# Patient Record
Sex: FEMALE | Race: WHITE | NOT HISPANIC OR LATINO | URBAN - METROPOLITAN AREA
[De-identification: names, ages, dates, MRNs, and addresses within clinical notes are randomized per-mention and may not be internally consistent; named-entity substitution may affect disease eponyms.]

---

## 2017-01-05 ENCOUNTER — EMERGENCY (EMERGENCY)
Facility: HOSPITAL | Age: 62
LOS: 1 days | Discharge: HOME | End: 2017-01-05
Attending: EMERGENCY MEDICINE | Admitting: EMERGENCY MEDICINE
Payer: COMMERCIAL

## 2017-01-05 VITALS
DIASTOLIC BLOOD PRESSURE: 84 MMHG | TEMPERATURE: 98 F | OXYGEN SATURATION: 100 % | HEART RATE: 75 BPM | SYSTOLIC BLOOD PRESSURE: 180 MMHG | RESPIRATION RATE: 18 BRPM

## 2017-01-05 DIAGNOSIS — R06.02 SHORTNESS OF BREATH: ICD-10-CM

## 2017-01-05 DIAGNOSIS — R55 SYNCOPE AND COLLAPSE: ICD-10-CM

## 2017-01-05 DIAGNOSIS — Z90.49 ACQUIRED ABSENCE OF OTHER SPECIFIED PARTS OF DIGESTIVE TRACT: ICD-10-CM

## 2017-01-05 DIAGNOSIS — R07.89 OTHER CHEST PAIN: ICD-10-CM

## 2017-01-05 DIAGNOSIS — R51 HEADACHE: ICD-10-CM

## 2017-01-05 LAB
ALBUMIN SERPL ELPH-MCNC: 4.2 G/DL — SIGNIFICANT CHANGE UP (ref 3.3–5)
ALP SERPL-CCNC: 96 U/L — SIGNIFICANT CHANGE UP (ref 40–120)
ALT FLD-CCNC: 25 U/L RC — SIGNIFICANT CHANGE UP (ref 10–45)
ANION GAP SERPL CALC-SCNC: 13 MMOL/L — SIGNIFICANT CHANGE UP (ref 5–17)
AST SERPL-CCNC: 22 U/L — SIGNIFICANT CHANGE UP (ref 10–40)
BASOPHILS # BLD AUTO: 0 K/UL — SIGNIFICANT CHANGE UP (ref 0–0.2)
BASOPHILS NFR BLD AUTO: 0.1 % — SIGNIFICANT CHANGE UP (ref 0–2)
BILIRUB SERPL-MCNC: <0.1 MG/DL — LOW (ref 0.2–1.2)
BUN SERPL-MCNC: 14 MG/DL — SIGNIFICANT CHANGE UP (ref 7–23)
CALCIUM SERPL-MCNC: 9.2 MG/DL — SIGNIFICANT CHANGE UP (ref 8.4–10.5)
CHLORIDE SERPL-SCNC: 104 MMOL/L — SIGNIFICANT CHANGE UP (ref 96–108)
CO2 SERPL-SCNC: 26 MMOL/L — SIGNIFICANT CHANGE UP (ref 22–31)
CREAT SERPL-MCNC: 0.8 MG/DL — SIGNIFICANT CHANGE UP (ref 0.5–1.3)
EOSINOPHIL # BLD AUTO: 0.2 K/UL — SIGNIFICANT CHANGE UP (ref 0–0.5)
EOSINOPHIL NFR BLD AUTO: 3 % — SIGNIFICANT CHANGE UP (ref 0–6)
GLUCOSE SERPL-MCNC: 96 MG/DL — SIGNIFICANT CHANGE UP (ref 70–99)
HCT VFR BLD CALC: 36.9 % — SIGNIFICANT CHANGE UP (ref 34.5–45)
HGB BLD-MCNC: 12.6 G/DL — SIGNIFICANT CHANGE UP (ref 11.5–15.5)
LYMPHOCYTES # BLD AUTO: 1.6 K/UL — SIGNIFICANT CHANGE UP (ref 1–3.3)
LYMPHOCYTES # BLD AUTO: 31.6 % — SIGNIFICANT CHANGE UP (ref 13–44)
MCHC RBC-ENTMCNC: 29.8 PG — SIGNIFICANT CHANGE UP (ref 27–34)
MCHC RBC-ENTMCNC: 34.1 GM/DL — SIGNIFICANT CHANGE UP (ref 32–36)
MCV RBC AUTO: 87.2 FL — SIGNIFICANT CHANGE UP (ref 80–100)
MONOCYTES # BLD AUTO: 0.4 K/UL — SIGNIFICANT CHANGE UP (ref 0–0.9)
MONOCYTES NFR BLD AUTO: 7.5 % — SIGNIFICANT CHANGE UP (ref 2–14)
NEUTROPHILS # BLD AUTO: 3 K/UL — SIGNIFICANT CHANGE UP (ref 1.8–7.4)
NEUTROPHILS NFR BLD AUTO: 57.8 % — SIGNIFICANT CHANGE UP (ref 43–77)
PLATELET # BLD AUTO: 247 K/UL — SIGNIFICANT CHANGE UP (ref 150–400)
POTASSIUM SERPL-MCNC: 4 MMOL/L — SIGNIFICANT CHANGE UP (ref 3.5–5.3)
POTASSIUM SERPL-SCNC: 4 MMOL/L — SIGNIFICANT CHANGE UP (ref 3.5–5.3)
PROT SERPL-MCNC: 7.1 G/DL — SIGNIFICANT CHANGE UP (ref 6–8.3)
RBC # BLD: 4.23 M/UL — SIGNIFICANT CHANGE UP (ref 3.8–5.2)
RBC # FLD: 12.3 % — SIGNIFICANT CHANGE UP (ref 10.3–14.5)
SODIUM SERPL-SCNC: 143 MMOL/L — SIGNIFICANT CHANGE UP (ref 135–145)
TROPONIN T SERPL-MCNC: <0.01 NG/ML — SIGNIFICANT CHANGE UP (ref 0–0.06)
WBC # BLD: 5.2 K/UL — SIGNIFICANT CHANGE UP (ref 3.8–10.5)
WBC # FLD AUTO: 5.2 K/UL — SIGNIFICANT CHANGE UP (ref 3.8–10.5)

## 2017-01-05 PROCEDURE — 71020: CPT | Mod: 26

## 2017-01-05 PROCEDURE — 70450 CT HEAD/BRAIN W/O DYE: CPT | Mod: 26

## 2017-01-05 PROCEDURE — 93010 ELECTROCARDIOGRAM REPORT: CPT | Mod: 77

## 2017-01-05 PROCEDURE — 93010 ELECTROCARDIOGRAM REPORT: CPT

## 2017-01-05 PROCEDURE — 99236 HOSP IP/OBS SAME DATE HI 85: CPT | Mod: 25

## 2017-01-05 RX ORDER — ASPIRIN/CALCIUM CARB/MAGNESIUM 324 MG
325 TABLET ORAL ONCE
Qty: 0 | Refills: 0 | Status: COMPLETED | OUTPATIENT
Start: 2017-01-05 | End: 2017-01-05

## 2017-01-05 RX ORDER — SODIUM CHLORIDE 9 MG/ML
3 INJECTION INTRAMUSCULAR; INTRAVENOUS; SUBCUTANEOUS EVERY 12 HOURS
Qty: 0 | Refills: 0 | Status: DISCONTINUED | OUTPATIENT
Start: 2017-01-05 | End: 2017-01-09

## 2017-01-05 RX ORDER — SODIUM CHLORIDE 9 MG/ML
1000 INJECTION INTRAMUSCULAR; INTRAVENOUS; SUBCUTANEOUS ONCE
Qty: 0 | Refills: 0 | Status: COMPLETED | OUTPATIENT
Start: 2017-01-05 | End: 2017-01-05

## 2017-01-05 RX ORDER — SODIUM CHLORIDE 9 MG/ML
3 INJECTION INTRAMUSCULAR; INTRAVENOUS; SUBCUTANEOUS ONCE
Qty: 0 | Refills: 0 | Status: COMPLETED | OUTPATIENT
Start: 2017-01-05 | End: 2017-01-05

## 2017-01-05 RX ADMIN — SODIUM CHLORIDE 1000 MILLILITER(S): 9 INJECTION INTRAMUSCULAR; INTRAVENOUS; SUBCUTANEOUS at 20:07

## 2017-01-05 RX ADMIN — SODIUM CHLORIDE 3 MILLILITER(S): 9 INJECTION INTRAMUSCULAR; INTRAVENOUS; SUBCUTANEOUS at 23:29

## 2017-01-05 RX ADMIN — Medication 325 MILLIGRAM(S): at 20:08

## 2017-01-05 RX ADMIN — SODIUM CHLORIDE 3 MILLILITER(S): 9 INJECTION INTRAMUSCULAR; INTRAVENOUS; SUBCUTANEOUS at 20:07

## 2017-01-05 NOTE — ED CDU PROVIDER NOTE - ENMT, MLM
Airway patent. Nasal mucosa clear. Mouth with normal mucosa. Throat has no vesicles, no oropharyngeal exudates and uvula is midline.

## 2017-01-05 NOTE — ED ADULT NURSE NOTE - OBJECTIVE STATEMENT
60 y/o female pt presents to ED c/o SOB and CP x 20 mins, states pain is epigastric and began while walking, no fevers/chills, no vomiting, states had nausea with pain. No cardiac hx, nonsmoker, no drinking or drug use. Pt placed on cardiac monitor in NSR. Abd nontender and nondistended, skin warm dry and intact. Lungs clear, pt in no resp distress. Pt in no acute distress.

## 2017-01-05 NOTE — ED ADULT NURSE REASSESSMENT NOTE - NS ED NURSE REASSESS COMMENT FT1
pt awaiting CT results
pt to CT
pt to xray
Pt received from QING Rizzo. Pt oriented to CDU & plan of care was discussed. No complaints of chest pain, SOB, dizziness or palpitations. Safety & comfort measures maintained. Call bell in reach. Will continue to monitor.

## 2017-01-05 NOTE — ED CDU PROVIDER NOTE - ATTENDING CONTRIBUTION TO CARE
I have personally performed a face to face diagnostic evaluation on this patient.  I have reviewed the ACP note and agree with the history, exam, and plan of care, except as noted.  History and Exam by me shows  See ED provider note  Wilmar Barber MD, Facep

## 2017-01-05 NOTE — ED PROVIDER NOTE - NS ED ATTENDING STATEMENT MOD
I have personally seen and examined this patient. I have fully participated in the care of this patient. I have reviewed all pertinent clinical information, including history physical exam, plan and the Resident's note and agree except as noted I have personally performed a face to face diagnostic evaluation on this patient. I have reviewed the PA note and agree with the history, exam, and plan of care, except as noted.

## 2017-01-05 NOTE — ED CDU PROVIDER NOTE - OBJECTIVE STATEMENT
61 F with no significant PMHx presenting with SOB and CP. Onset noon today, lasted 20 min. Intermittent, exertional, associated with N, no V. Never happened before. Substernal, radiating to L arm. +lightheadedness, mild occipital headache. Never smoker.   No medications or allergies. 61 F with no significant PMHx came to ED with c/o cp, lightheadedness, weakness, sob episode that started today at 11:00am. Pt reports that she had just got to work and sat down and then symptoms came on suddenly. never experienced in the past. episode lasted 20 min. pt reports cp radiated to L arm. +lightheadedness, mild occipital headache. +N.  pt reports she did not feel like she was going to pass out but very weak that she couldn't stand.  Never smoker.   No medications or allergies.

## 2017-01-05 NOTE — ED PROVIDER NOTE - MEDICAL DECISION MAKING DETAILS
62 y/o F here for chest pain. R/O ACS given classic history. Will give ASA, EKG, CXR, cardiac enzymes, basic labs, likely admit for stress test vs cath.

## 2017-01-05 NOTE — ED PROVIDER NOTE - ATTENDING CONTRIBUTION TO CARE
Private Physician Gaby Gipson ([facility])  61y female pmh no meds, no habits, Hypertension,hld,cad,dm,travel,cancer, Pt comes to ed complains of 3d hx of SOB, chest pain, lightheadedness, and sensation of "pulling in her head, like the back of head is pulling down/heavyness"  No pain.  Not worst ha of life not severe. Nausea without vomiting. No fever and chills no cough. hemoptysis. Seen at urgent care and referred to ed. Now feels "ok" at present,. No precipitating or alleviating factors. Lasts 20 minutes. Has had two recurrencs since. PE WDWN male nad normocephalic atraumatic chest clear anterior & posterior abd soft CV no rubs, gallops or murmurs, Abd soft neruo no focal defects  Wilmar Barber MD, Facep

## 2017-01-05 NOTE — ED PROVIDER NOTE - OBJECTIVE STATEMENT
61 F with no significant PMHx presenting with SOB and CP. Onset noon today, lasted 20 min. Intermittent, exertional, associated with N, no V. Never happened before. Substernal, radiating to L arm. +lightheadedness, mild occipital headache. Never smoker.   No medications or allergies.

## 2017-01-05 NOTE — ED CDU PROVIDER NOTE - PLAN OF CARE
1. Stay hydrated.  2. Follow up with your PCP Dr. De Souza in 1-2 days (Bring printed results to your doctor visit).  3. Return if symptoms, worsen, fever, weakness, chest pain, difficulty breathing, dizziness and all other concerns. 1. Stay hydrated. Take Aspirin 81mg once a day until further follow up.   2. Follow up with your PCP Dr. De Souza in 1-2 days or cardiologist (information provided) Bring printed results to your doctor visit.  3. Return if symptoms, worsen, fever, weakness, chest pain, difficulty breathing, dizziness and all other concerns.

## 2017-01-06 VITALS
OXYGEN SATURATION: 100 % | RESPIRATION RATE: 18 BRPM | TEMPERATURE: 98 F | SYSTOLIC BLOOD PRESSURE: 115 MMHG | HEART RATE: 61 BPM | DIASTOLIC BLOOD PRESSURE: 66 MMHG

## 2017-01-06 DIAGNOSIS — Z90.49 ACQUIRED ABSENCE OF OTHER SPECIFIED PARTS OF DIGESTIVE TRACT: Chronic | ICD-10-CM

## 2017-01-06 LAB
CHOLEST SERPL-MCNC: 178 MG/DL — SIGNIFICANT CHANGE UP (ref 10–199)
HBA1C BLD-MCNC: 5.7 % — HIGH (ref 4–5.6)
HDLC SERPL-MCNC: 43 MG/DL — SIGNIFICANT CHANGE UP (ref 40–125)
LIPID PNL WITH DIRECT LDL SERPL: 107 MG/DL — SIGNIFICANT CHANGE UP
TOTAL CHOLESTEROL/HDL RATIO MEASUREMENT: 4.1 RATIO — SIGNIFICANT CHANGE UP (ref 3.3–7.1)
TRIGL SERPL-MCNC: 139 MG/DL — SIGNIFICANT CHANGE UP (ref 10–149)
TROPONIN T SERPL-MCNC: <0.01 NG/ML — SIGNIFICANT CHANGE UP (ref 0–0.06)

## 2017-01-06 PROCEDURE — 80053 COMPREHEN METABOLIC PANEL: CPT

## 2017-01-06 PROCEDURE — 75574 CT ANGIO HRT W/3D IMAGE: CPT

## 2017-01-06 PROCEDURE — 85027 COMPLETE CBC AUTOMATED: CPT

## 2017-01-06 PROCEDURE — 82550 ASSAY OF CK (CPK): CPT

## 2017-01-06 PROCEDURE — 70450 CT HEAD/BRAIN W/O DYE: CPT

## 2017-01-06 PROCEDURE — 83036 HEMOGLOBIN GLYCOSYLATED A1C: CPT

## 2017-01-06 PROCEDURE — 99284 EMERGENCY DEPT VISIT MOD MDM: CPT | Mod: 25

## 2017-01-06 PROCEDURE — 84484 ASSAY OF TROPONIN QUANT: CPT

## 2017-01-06 PROCEDURE — 75574 CT ANGIO HRT W/3D IMAGE: CPT | Mod: 26

## 2017-01-06 PROCEDURE — 80061 LIPID PANEL: CPT

## 2017-01-06 PROCEDURE — 82553 CREATINE MB FRACTION: CPT

## 2017-01-06 PROCEDURE — 71046 X-RAY EXAM CHEST 2 VIEWS: CPT

## 2017-01-06 PROCEDURE — 93005 ELECTROCARDIOGRAM TRACING: CPT | Mod: 76

## 2017-01-06 PROCEDURE — G0378: CPT

## 2017-01-06 RX ADMIN — SODIUM CHLORIDE 3 MILLILITER(S): 9 INJECTION INTRAMUSCULAR; INTRAVENOUS; SUBCUTANEOUS at 08:12

## 2018-09-07 ENCOUNTER — EMERGENCY (EMERGENCY)
Facility: HOSPITAL | Age: 63
LOS: 1 days | Discharge: HOME | End: 2018-09-07
Attending: EMERGENCY MEDICINE
Payer: COMMERCIAL

## 2018-09-07 VITALS
OXYGEN SATURATION: 95 % | WEIGHT: 134.92 LBS | HEIGHT: 62 IN | RESPIRATION RATE: 18 BRPM | SYSTOLIC BLOOD PRESSURE: 166 MMHG | DIASTOLIC BLOOD PRESSURE: 83 MMHG | TEMPERATURE: 98 F | HEART RATE: 100 BPM

## 2018-09-07 VITALS
OXYGEN SATURATION: 97 % | TEMPERATURE: 98 F | SYSTOLIC BLOOD PRESSURE: 149 MMHG | DIASTOLIC BLOOD PRESSURE: 79 MMHG | HEART RATE: 65 BPM | RESPIRATION RATE: 18 BRPM

## 2018-09-07 DIAGNOSIS — Z90.49 ACQUIRED ABSENCE OF OTHER SPECIFIED PARTS OF DIGESTIVE TRACT: Chronic | ICD-10-CM

## 2018-09-07 PROCEDURE — 70450 CT HEAD/BRAIN W/O DYE: CPT

## 2018-09-07 PROCEDURE — 73080 X-RAY EXAM OF ELBOW: CPT | Mod: 26,LT

## 2018-09-07 PROCEDURE — 73502 X-RAY EXAM HIP UNI 2-3 VIEWS: CPT

## 2018-09-07 PROCEDURE — 73030 X-RAY EXAM OF SHOULDER: CPT | Mod: 26,LT

## 2018-09-07 PROCEDURE — 99284 EMERGENCY DEPT VISIT MOD MDM: CPT | Mod: 25

## 2018-09-07 PROCEDURE — 70450 CT HEAD/BRAIN W/O DYE: CPT | Mod: 26

## 2018-09-07 PROCEDURE — 99284 EMERGENCY DEPT VISIT MOD MDM: CPT

## 2018-09-07 PROCEDURE — 73030 X-RAY EXAM OF SHOULDER: CPT

## 2018-09-07 PROCEDURE — 73502 X-RAY EXAM HIP UNI 2-3 VIEWS: CPT | Mod: 26,LT

## 2018-09-07 PROCEDURE — 73080 X-RAY EXAM OF ELBOW: CPT

## 2018-09-07 RX ORDER — ACETAMINOPHEN 500 MG
975 TABLET ORAL ONCE
Qty: 0 | Refills: 0 | Status: COMPLETED | OUTPATIENT
Start: 2018-09-07 | End: 2018-09-07

## 2018-09-07 RX ORDER — ONDANSETRON 8 MG/1
4 TABLET, FILM COATED ORAL ONCE
Qty: 0 | Refills: 0 | Status: COMPLETED | OUTPATIENT
Start: 2018-09-07 | End: 2018-09-07

## 2018-09-07 RX ADMIN — ONDANSETRON 4 MILLIGRAM(S): 8 TABLET, FILM COATED ORAL at 18:41

## 2018-09-07 RX ADMIN — Medication 975 MILLIGRAM(S): at 18:40

## 2018-09-07 NOTE — ED PROVIDER NOTE - PHYSICAL EXAMINATION
head atraumatic, spine nontender, no focal bony ttp left shoulder/elbow/forearm. no focal ttp left hip

## 2018-09-07 NOTE — ED PROVIDER NOTE - CARE PLAN
Principal Discharge DX:	Closed head injury  Assessment and plan of treatment:	Your CAT scan and x-rays showed no internal injuries. You may use Tylenol 650mg every 8 hours or Motrin 600mg every 8 hours as needed for pain.     Please see your regular doctor in 3-5 days to ensure improvement.  Secondary Diagnosis:	Hip pain

## 2018-09-07 NOTE — ED ADULT TRIAGE NOTE - CHIEF COMPLAINT QUOTE
Pt slipped and fell yesterday on wet floor, hit L side of head on tile floor, no LOC.   Pt also hit L arm and L hip/knee/leg. Pt reports L side of body pain was mild, but worsened today.  Went to PCP Gaby Powell who sent pt in for r/o L hip fx and concussion.  Not on blood thinners. No confusion, visual changes, weakness.

## 2018-09-07 NOTE — ED ADULT NURSE NOTE - OBJECTIVE STATEMENT
61 y/o female presents to ed s/p fall yesterday. States she was in the bathroom when she fell on her left side injuring her left arm and left leg/hip. Denies LOC/ hitting her Head. denies chest pain, sob, ha, n/v/d, abdominal pain, f/c, urinary symptoms, hematuria. A&Ox4, vss, slight swelling noted to left hand and elbow, skin warm dry and intact, MAEx4, lungs CTA, abd soft nondistended. Pt resting comfortably with VSS, no complaints at this time. Patient's bed in the lowest position, explained plan of care to patient and family members. Will continue to reassess.

## 2018-09-07 NOTE — ED PROVIDER NOTE - PLAN OF CARE
Your CAT scan and x-rays showed no internal injuries. You may use Tylenol 650mg every 8 hours or Motrin 600mg every 8 hours as needed for pain.     Please see your regular doctor in 3-5 days to ensure improvement.

## 2018-09-07 NOTE — ED ADULT NURSE NOTE - NSIMPLEMENTINTERV_GEN_ALL_ED
Implemented All Fall Risk Interventions:  [place] to call system. Call bell, personal items and telephone within reach. Instruct patient to call for assistance. Room bathroom lighting operational. Non-slip footwear when patient is off stretcher. Physically safe environment: no spills, clutter or unnecessary equipment. Stretcher in lowest position, wheels locked, appropriate side rails in place. Provide visual cue, wrist band, yellow gown, etc. Monitor gait and stability. Monitor for mental status changes and reorient to person, place, and time. Review medications for side effects contributing to fall risk. Reinforce activity limits and safety measures with patient and family.

## 2018-09-07 NOTE — ED PROVIDER NOTE - OBJECTIVE STATEMENT
62F pw left sided headache, shoulder/elbow pain and left hip pain after fall from standing and slip onto left side 1 day prior. +head strike but no LOC. +nausea but no emesis. No lateralizing weakness, no visual changes. Patient ambulatory but with pain of left hip. Took motrin the day of presentation with some improvement in pain. Seen by PMD and referred to ED for XR to ro fx of hip

## 2018-09-07 NOTE — ED PROVIDER NOTE - MEDICAL DECISION MAKING DETAILS
Attending MD Silveira: 62F with left sided headache, nausea and LUE and L hip pain sp fall from standing 1 day prior. Nonfocal neuro exam, no focal bony ttp, Cervical spine cleared clinically of fracture without need for imaging according to [employer] Criteria. Plan for CT head to ro ICH, XR L shoulder/elbow and hip to ro fx

## 2019-10-25 ENCOUNTER — NON-APPOINTMENT (OUTPATIENT)
Age: 64
End: 2019-10-25

## 2019-10-25 ENCOUNTER — APPOINTMENT (OUTPATIENT)
Dept: OPHTHALMOLOGY | Facility: CLINIC | Age: 64
End: 2019-10-25
Payer: COMMERCIAL

## 2019-10-25 PROCEDURE — 92015 DETERMINE REFRACTIVE STATE: CPT

## 2019-10-25 PROCEDURE — 92004 COMPRE OPH EXAM NEW PT 1/>: CPT

## 2019-11-22 ENCOUNTER — APPOINTMENT (OUTPATIENT)
Dept: RHEUMATOLOGY | Facility: CLINIC | Age: 64
End: 2019-11-22
Payer: COMMERCIAL

## 2019-11-22 ENCOUNTER — LABORATORY RESULT (OUTPATIENT)
Age: 64
End: 2019-11-22

## 2019-11-22 VITALS
DIASTOLIC BLOOD PRESSURE: 74 MMHG | TEMPERATURE: 97.9 F | WEIGHT: 143 LBS | SYSTOLIC BLOOD PRESSURE: 125 MMHG | BODY MASS INDEX: 26.31 KG/M2 | HEIGHT: 62 IN | HEART RATE: 65 BPM | OXYGEN SATURATION: 98 %

## 2019-11-22 DIAGNOSIS — M70.61 TROCHANTERIC BURSITIS, RIGHT HIP: ICD-10-CM

## 2019-11-22 DIAGNOSIS — M54.2 CERVICALGIA: ICD-10-CM

## 2019-11-22 PROCEDURE — 99204 OFFICE O/P NEW MOD 45 MIN: CPT

## 2019-12-03 ENCOUNTER — APPOINTMENT (OUTPATIENT)
Dept: RADIOLOGY | Facility: IMAGING CENTER | Age: 64
End: 2019-12-03
Payer: COMMERCIAL

## 2019-12-03 ENCOUNTER — OUTPATIENT (OUTPATIENT)
Dept: OUTPATIENT SERVICES | Facility: HOSPITAL | Age: 64
LOS: 1 days | End: 2019-12-03
Payer: COMMERCIAL

## 2019-12-03 DIAGNOSIS — M54.16 RADICULOPATHY, LUMBAR REGION: ICD-10-CM

## 2019-12-03 DIAGNOSIS — Z90.49 ACQUIRED ABSENCE OF OTHER SPECIFIED PARTS OF DIGESTIVE TRACT: Chronic | ICD-10-CM

## 2019-12-03 DIAGNOSIS — M25.50 PAIN IN UNSPECIFIED JOINT: ICD-10-CM

## 2019-12-03 DIAGNOSIS — M54.2 CERVICALGIA: ICD-10-CM

## 2019-12-03 PROCEDURE — 72100 X-RAY EXAM L-S SPINE 2/3 VWS: CPT | Mod: 26

## 2019-12-03 PROCEDURE — 72040 X-RAY EXAM NECK SPINE 2-3 VW: CPT | Mod: 26

## 2019-12-03 PROCEDURE — 73120 X-RAY EXAM OF HAND: CPT | Mod: 26,50

## 2019-12-03 PROCEDURE — 77080 DXA BONE DENSITY AXIAL: CPT | Mod: 26

## 2019-12-03 PROCEDURE — 72040 X-RAY EXAM NECK SPINE 2-3 VW: CPT

## 2019-12-03 PROCEDURE — 73120 X-RAY EXAM OF HAND: CPT

## 2019-12-03 PROCEDURE — 72100 X-RAY EXAM L-S SPINE 2/3 VWS: CPT

## 2019-12-03 PROCEDURE — 77080 DXA BONE DENSITY AXIAL: CPT

## 2019-12-05 LAB
25(OH)D3 SERPL-MCNC: 33.8 NG/ML
ALBUMIN MFR SERPL ELPH: 62.5 %
ALBUMIN SERPL ELPH-MCNC: 4.7 G/DL
ALBUMIN SERPL-MCNC: 4.5 G/DL
ALBUMIN/GLOB SERPL: 1.7 RATIO
ALP BLD-CCNC: 90 U/L
ALPHA1 GLOB MFR SERPL ELPH: 4.1 %
ALPHA1 GLOB SERPL ELPH-MCNC: 0.3 G/DL
ALPHA2 GLOB MFR SERPL ELPH: 10.7 %
ALPHA2 GLOB SERPL ELPH-MCNC: 0.8 G/DL
ALT SERPL-CCNC: 20 U/L
ANION GAP SERPL CALC-SCNC: 13 MMOL/L
AST SERPL-CCNC: 21 U/L
B-GLOBULIN MFR SERPL ELPH: 11.6 %
B-GLOBULIN SERPL ELPH-MCNC: 0.8 G/DL
BASOPHILS # BLD AUTO: 0.04 K/UL
BASOPHILS NFR BLD AUTO: 0.8 %
BILIRUB SERPL-MCNC: 0.2 MG/DL
BUN SERPL-MCNC: 12 MG/DL
CALCIUM SERPL-MCNC: 10.1 MG/DL
CCP AB SER IA-ACNC: <8 UNITS
CHLORIDE SERPL-SCNC: 102 MMOL/L
CO2 SERPL-SCNC: 26 MMOL/L
CREAT SERPL-MCNC: 0.69 MG/DL
CRP SERPL-MCNC: 0.82 MG/DL
EOSINOPHIL # BLD AUTO: 0.11 K/UL
EOSINOPHIL NFR BLD AUTO: 2.2 %
ERYTHROCYTE [SEDIMENTATION RATE] IN BLOOD BY WESTERGREN METHOD: 58 MM/HR
GAMMA GLOB FLD ELPH-MCNC: 0.8 G/DL
GAMMA GLOB MFR SERPL ELPH: 11.1 %
GLUCOSE SERPL-MCNC: 91 MG/DL
HCT VFR BLD CALC: 39.9 %
HGB BLD-MCNC: 12.2 G/DL
IGA 24H UR QL IFE: NORMAL
IMM GRANULOCYTES NFR BLD AUTO: 0.4 %
INTERPRETATION SERPL IEP-IMP: NORMAL
LYMPHOCYTES # BLD AUTO: 1.39 K/UL
LYMPHOCYTES NFR BLD AUTO: 28.2 %
M PROTEIN SPEC IFE-MCNC: NORMAL
MAN DIFF?: NORMAL
MCHC RBC-ENTMCNC: 27.6 PG
MCHC RBC-ENTMCNC: 30.6 GM/DL
MCV RBC AUTO: 90.3 FL
MONOCYTES # BLD AUTO: 0.33 K/UL
MONOCYTES NFR BLD AUTO: 6.7 %
NEUTROPHILS # BLD AUTO: 3.04 K/UL
NEUTROPHILS NFR BLD AUTO: 61.7 %
PLATELET # BLD AUTO: 258 K/UL
POTASSIUM SERPL-SCNC: 4.2 MMOL/L
PROT SERPL-MCNC: 7.2 G/DL
RBC # BLD: 4.42 M/UL
RBC # FLD: 13.2 %
RF+CCP IGG SER-IMP: NEGATIVE
RHEUMATOID FACT SER QL: <10 IU/ML
SODIUM SERPL-SCNC: 141 MMOL/L
WBC # FLD AUTO: 4.93 K/UL

## 2020-01-09 ENCOUNTER — APPOINTMENT (OUTPATIENT)
Dept: RHEUMATOLOGY | Facility: CLINIC | Age: 65
End: 2020-01-09
Payer: COMMERCIAL

## 2020-01-09 VITALS
OXYGEN SATURATION: 99 % | BODY MASS INDEX: 26.13 KG/M2 | HEIGHT: 62 IN | HEART RATE: 71 BPM | WEIGHT: 142 LBS | TEMPERATURE: 97.8 F | SYSTOLIC BLOOD PRESSURE: 132 MMHG | DIASTOLIC BLOOD PRESSURE: 75 MMHG

## 2020-01-09 DIAGNOSIS — M77.8 OTHER ENTHESOPATHIES, NOT ELSEWHERE CLASSIFIED: ICD-10-CM

## 2020-01-09 DIAGNOSIS — M15.4 EROSIVE (OSTEO)ARTHRITIS: ICD-10-CM

## 2020-01-09 DIAGNOSIS — M65.312 TRIGGER THUMB, LEFT THUMB: ICD-10-CM

## 2020-01-09 DIAGNOSIS — M85.80 OTHER SPECIFIED DISORDERS OF BONE DENSITY AND STRUCTURE, UNSPECIFIED SITE: ICD-10-CM

## 2020-01-09 DIAGNOSIS — R70.0 ELEVATED ERYTHROCYTE SEDIMENTATION RATE: ICD-10-CM

## 2020-01-09 PROCEDURE — 99213 OFFICE O/P EST LOW 20 MIN: CPT

## 2020-01-09 NOTE — DATA REVIEWED
[FreeTextEntry1] : Discussed results with patient \par Elevated ESR, CRP markers of inflammation \par Negative RF, CCP no seropositive RA\par SPEP/UPEP normal \par \par Xrays of the hands: overall normal except for possible tiny erosion at right third DIP\par Xrays of the c-spine and L-spine: OA changes\par DEXA: osteopenia\par

## 2020-01-09 NOTE — PHYSICAL EXAM
[Sclera] : the sclera and conjunctiva were normal [General Appearance - Alert] : alert [General Appearance - In No Acute Distress] : in no acute distress [Neck Appearance] : the appearance of the neck was normal [Hearing Threshold Finger Rub Not Weld] : hearing was normal [Heart Sounds] : normal S1 and S2 [Auscultation Breath Sounds / Voice Sounds] : lungs were clear to auscultation bilaterally [Edema] : there was no peripheral edema [Abdomen Soft] : soft [Cervical Lymph Nodes Enlarged Posterior Bilaterally] : posterior cervical [Abdomen Tenderness] : non-tender [Cervical Lymph Nodes Enlarged Anterior Bilaterally] : anterior cervical [FreeTextEntry1] : tenderness over the right epicondyle, no effusion. tenderness over right third DIP . trigger finger left thumb. normal ROM at all tested joints  [Musculoskeletal - Swelling] : no joint swelling seen [] : no rash [No Focal Deficits] : no focal deficits [Oriented To Time, Place, And Person] : oriented to person, place, and time

## 2020-01-09 NOTE — HISTORY OF PRESENT ILLNESS
[de-identified] : Since last visit, [FreeTextEntry1] : -reports feeling well overall\par -pain at right hip resolved\par -remains having the left thumb trigger finger\par -occasional pain at the DIPs mostly the right third\par no swelling\par minimal morning stiffness\par -no myalgias, fevers/chills\par

## 2020-01-09 NOTE — ASSESSMENT
[FreeTextEntry1] : 1) Current complaints: \par -Left thumb trigger finger\par -Right medial epicondylitis\par -Left rotator cuff tendinopathy\par \par 2) OA of the hands\par \par =No evidence of inflammatory arthritis on exam or by history \par =Xray of the hands reviewed, questionable small erosion at the right third DIP\par =RF, CCP negative\par In view of above, and the fact that RA doesn't involve the DIP, the patient most likely OA possible an erosive form.\par =Advised about NSAIDs as needed for pain control,has a history of GI intolerance to regular NSAIDs will prescribe celecoxib instead, if not approved can try topicals, \par =Physical activity and regular exercises\par =recommend to use splint for trigger finger, if no improvement will refer for steroid injection (currently she would like to hold off)\par \par =Elevated ESR, CRP without inflammatory arthritis\par SPEP/UPEP normal\par would check SSA/SSB, Hepatitis panel\par patient requesting testing at next visit \par UTD with age appropriate screening \par \par 2) Numbness of hands, left foot\par concern for radiculopathy\par Xray of the c-L spines with osteophytes and degenerative disc disease \par recommended neurology eval\par \par 3) Bone health\par  DEXA with osteopenia\par Prescribed  Ca+Vitamin D\par \par RTO in 2 months\par \par \par Patient aware of my assessment and plan, all questions answered.\par \par

## 2020-04-28 ENCOUNTER — APPOINTMENT (OUTPATIENT)
Dept: RHEUMATOLOGY | Facility: CLINIC | Age: 65
End: 2020-04-28

## 2020-06-18 NOTE — ED CDU PROVIDER NOTE - RELIEVING FACTORS
none
Additional Notes: Patient consent was obtained to proceed with the visit and recommended plan of care after discussion of all risks and benefits, including the risks of COVID-19 exposure.\\n\\n• Have you been diagnosed with COVID-19? - NO\\n• Have you been exposed to someone with COVID-19? - NO\\n• In the past 2 weeks, have you had any of the following symptoms? - NO Fever or temperature greater than 100 Cough Shortness of breath\\nMuscle aches\\nFatigue\\n• In the past 30 days have you travelled to/on any of the following? - NO [place]/ [place]\\n[place]\\n[place]ise [place]
Detail Level: Simple

## 2020-07-14 NOTE — ED CDU PROVIDER NOTE - DATE/TIME 4
Patient called requesting a new prescription for Flexeril 5mg for muscle spasms caused by history of radiation therapy. She only takes this medication minimally (once daily prn), and recently ran out of refills.  No longer on active medication list. Will route to provider for review/reorder
06-Jan-2017 12:20

## 2020-09-14 NOTE — ED ADULT NURSE NOTE - ISOLATION TYPE:
----- Message from Mary Beth Forde sent at 9/14/2020  2:22 PM CDT -----  Caller: Patient                   Callback number: [phone]                        Reason: Returning call to schedule lab & Urine apt for Dr El Arellano pt: request apt on 09/23/2020 if possible                 None

## 2022-03-25 NOTE — ED CDU PROVIDER NOTE - PROGRESS NOTE DETAILS
03/25/22 1218   Post-Acute Status   Post-Acute Authorization Home Health;IV Infusion   Home Health Status Pending Payor Review  (Pending PHN authorization for [facility], patient is current with this agency. Notified  that patient is expected to dc 3/26/22.)   IV Infusion Status Pending payor review/awaiting authorization (if required)  (Pending PHN authorization for [facility].)   Hospital Resources/Appts/Education Provided Appointments scheduled and added to AVS  (PCP appt scheduled on 3/29/22 at 3:00PM with Dr. Callum Roberts.)     Julia Menjivar LMSW  [facility]  Ext. [phone]   CDU NOTE CATHERINE Nova: pt asleep. NAD. no events on tele. CDU NOTE CATHERINE Nova: pt asleep. NAD VSS. no events on tele. CDU NOTE CATHERINE Nova: pt resting comfortably, feels well without complaint. NAD VSS. no events on tele. Patient resting in bed comfortably. No distress, no complaints. Denies CP, SOB. Vital Signs Stable. No events on telemetry monitor.  Awaiting CT coronary. -Yina Baldwin PA-C Patient resting in bed comfortably. No distress, no complaints. Vital Signs Stable. No events on telemetry monitor.  -Yina Baldwin PA-C namrata: I have personally performed a face to face diagnostic evaluation on this patient.  I have reviewed the ACP note and agree with the history, exam, and plan of care.  CP resolved.  ct coronaries reviewed with patient.  stable for d/c.

## 2022-08-13 ENCOUNTER — INPATIENT (INPATIENT)
Facility: HOSPITAL | Age: 67
LOS: 3 days | Discharge: HOME | DRG: 123 | End: 2022-08-17
Attending: PSYCHIATRY & NEUROLOGY | Admitting: PSYCHIATRY & NEUROLOGY
Payer: COMMERCIAL

## 2022-08-13 VITALS
RESPIRATION RATE: 18 BRPM | HEIGHT: 63 IN | WEIGHT: 139.99 LBS | DIASTOLIC BLOOD PRESSURE: 90 MMHG | TEMPERATURE: 98 F | OXYGEN SATURATION: 98 % | HEART RATE: 81 BPM | SYSTOLIC BLOOD PRESSURE: 166 MMHG

## 2022-08-13 LAB
ALBUMIN SERPL ELPH-MCNC: 4.8 G/DL — SIGNIFICANT CHANGE UP (ref 3.3–5)
ALP SERPL-CCNC: 134 U/L — HIGH (ref 40–120)
ALT FLD-CCNC: 33 U/L — SIGNIFICANT CHANGE UP (ref 10–45)
ANION GAP SERPL CALC-SCNC: 12 MMOL/L — SIGNIFICANT CHANGE UP (ref 5–17)
APTT BLD: 30.2 SEC — SIGNIFICANT CHANGE UP (ref 27.5–35.5)
AST SERPL-CCNC: 30 U/L — SIGNIFICANT CHANGE UP (ref 10–40)
BASOPHILS # BLD AUTO: 0.06 K/UL — SIGNIFICANT CHANGE UP (ref 0–0.2)
BASOPHILS NFR BLD AUTO: 1.2 % — SIGNIFICANT CHANGE UP (ref 0–2)
BILIRUB SERPL-MCNC: 0.2 MG/DL — SIGNIFICANT CHANGE UP (ref 0.2–1.2)
BUN SERPL-MCNC: 15 MG/DL — SIGNIFICANT CHANGE UP (ref 7–23)
CALCIUM SERPL-MCNC: 10.4 MG/DL — SIGNIFICANT CHANGE UP (ref 8.4–10.5)
CHLORIDE SERPL-SCNC: 102 MMOL/L — SIGNIFICANT CHANGE UP (ref 96–108)
CO2 SERPL-SCNC: 26 MMOL/L — SIGNIFICANT CHANGE UP (ref 22–31)
CREAT SERPL-MCNC: 0.83 MG/DL — SIGNIFICANT CHANGE UP (ref 0.5–1.3)
CRP SERPL-MCNC: 20 MG/L — HIGH (ref 0–4)
EGFR: 79 ML/MIN/1.73M2 — SIGNIFICANT CHANGE UP
EOSINOPHIL # BLD AUTO: 0.4 K/UL — SIGNIFICANT CHANGE UP (ref 0–0.5)
EOSINOPHIL NFR BLD AUTO: 8.1 % — HIGH (ref 0–6)
GLUCOSE SERPL-MCNC: 99 MG/DL — SIGNIFICANT CHANGE UP (ref 70–99)
HCT VFR BLD CALC: 37.7 % — SIGNIFICANT CHANGE UP (ref 34.5–45)
HGB BLD-MCNC: 11.9 G/DL — SIGNIFICANT CHANGE UP (ref 11.5–15.5)
IMM GRANULOCYTES NFR BLD AUTO: 0.4 % — SIGNIFICANT CHANGE UP (ref 0–1.5)
INR BLD: 1.02 RATIO — SIGNIFICANT CHANGE UP (ref 0.88–1.16)
LYMPHOCYTES # BLD AUTO: 1.29 K/UL — SIGNIFICANT CHANGE UP (ref 1–3.3)
LYMPHOCYTES # BLD AUTO: 26 % — SIGNIFICANT CHANGE UP (ref 13–44)
MCHC RBC-ENTMCNC: 27.2 PG — SIGNIFICANT CHANGE UP (ref 27–34)
MCHC RBC-ENTMCNC: 31.6 GM/DL — LOW (ref 32–36)
MCV RBC AUTO: 86.1 FL — SIGNIFICANT CHANGE UP (ref 80–100)
MONOCYTES # BLD AUTO: 0.56 K/UL — SIGNIFICANT CHANGE UP (ref 0–0.9)
MONOCYTES NFR BLD AUTO: 11.3 % — SIGNIFICANT CHANGE UP (ref 2–14)
NEUTROPHILS # BLD AUTO: 2.63 K/UL — SIGNIFICANT CHANGE UP (ref 1.8–7.4)
NEUTROPHILS NFR BLD AUTO: 53 % — SIGNIFICANT CHANGE UP (ref 43–77)
NRBC # BLD: 0 /100 WBCS — SIGNIFICANT CHANGE UP (ref 0–0)
PLATELET # BLD AUTO: 252 K/UL — SIGNIFICANT CHANGE UP (ref 150–400)
POTASSIUM SERPL-MCNC: 3.9 MMOL/L — SIGNIFICANT CHANGE UP (ref 3.5–5.3)
POTASSIUM SERPL-SCNC: 3.9 MMOL/L — SIGNIFICANT CHANGE UP (ref 3.5–5.3)
PROT SERPL-MCNC: 7.6 G/DL — SIGNIFICANT CHANGE UP (ref 6–8.3)
PROTHROM AB SERPL-ACNC: 11.8 SEC — SIGNIFICANT CHANGE UP (ref 10.5–13.4)
RBC # BLD: 4.38 M/UL — SIGNIFICANT CHANGE UP (ref 3.8–5.2)
RBC # FLD: 12.5 % — SIGNIFICANT CHANGE UP (ref 10.3–14.5)
SODIUM SERPL-SCNC: 140 MMOL/L — SIGNIFICANT CHANGE UP (ref 135–145)
TROPONIN T, HIGH SENSITIVITY RESULT: <6 NG/L — SIGNIFICANT CHANGE UP (ref 0–51)
WBC # BLD: 4.96 K/UL — SIGNIFICANT CHANGE UP (ref 3.8–10.5)
WBC # FLD AUTO: 4.96 K/UL — SIGNIFICANT CHANGE UP (ref 3.8–10.5)

## 2022-08-13 PROCEDURE — 99285 EMERGENCY DEPT VISIT HI MDM: CPT

## 2022-08-13 PROCEDURE — 70498 CT ANGIOGRAPHY NECK: CPT | Mod: 26,MA

## 2022-08-13 PROCEDURE — 0042T: CPT | Mod: MA

## 2022-08-13 PROCEDURE — 70496 CT ANGIOGRAPHY HEAD: CPT | Mod: 26,MA

## 2022-08-13 RX ORDER — MECLIZINE HCL 12.5 MG
25 TABLET ORAL ONCE
Refills: 0 | Status: COMPLETED | OUTPATIENT
Start: 2022-08-13 | End: 2022-08-13

## 2022-08-13 RX ORDER — ACETAMINOPHEN 500 MG
975 TABLET ORAL ONCE
Refills: 0 | Status: COMPLETED | OUTPATIENT
Start: 2022-08-13 | End: 2022-08-13

## 2022-08-13 RX ADMIN — Medication 975 MILLIGRAM(S): at 22:55

## 2022-08-13 RX ADMIN — Medication 25 MILLIGRAM(S): at 22:55

## 2022-08-13 NOTE — ED ADULT NURSE REASSESSMENT NOTE - NS ED NURSE REASSESS COMMENT FT1
Pt received from QING Loyola in green, A&Ox3, breathing spontaneously, unlabored & w/o distress on room air. Pt neuro checks continued, see neuro sheet in paper chart. Pt denying any complaints at this time. Awaiting dispo.

## 2022-08-13 NOTE — ED PROVIDER NOTE - PROGRESS NOTE DETAILS
Ariana Banda MD Attending Physician- endorsed to oncoming attg dr. neri hutchins pending imaging and neuro recs oleg consulted will see pt.  neuro accepts to their service

## 2022-08-13 NOTE — ED ADULT NURSE NOTE - OBJECTIVE STATEMENT
Patient is a 65 y/o female with no PMH presenting to the ED with c/o weakness and dizziness. Pt LKN yesterday. Pt has been feeling generalized weakness and dizziness since 10AM today. Pt denies numbness/weakness. Pt reports right sided headache and right eye pain. PERRLA. Pt A&Ox4, ambulatory, neuro intact, denies SOB, breathing unlabored on RA, denies chest pain, peripheral pulses intact, skin normal color/warm, denies n/v/d, denies urinary symptoms, denies fever, cough, chills, moving extremities w/o difficulty.

## 2022-08-13 NOTE — ED PROVIDER NOTE - CLINICAL SUMMARY MEDICAL DECISION MAKING FREE TEXT BOX
Yanira - Pt is a 65 yo F with no PMhx who presents with weakness and dizziness. code stroke called. ddx stroke vs bleed vs complex migraine vs low concern for Giant cell temporal arthritis. will check labs, ekg, esr, crp, ct head and cta head and neck

## 2022-08-13 NOTE — ED PROVIDER NOTE - ATTENDING CONTRIBUTION TO CARE
I, Ariana Banda, performed a history and physical exam of the patient and discussed their management with the resident and /or advanced care provider. I reviewed the resident and /or ACP's note and agree with the documented findings and plan of care except where otherwise noted in my note. I was present and available for all procedures.     CODE STROKE CALLED IN TRIAGE    65 yo F no pmh presents with 2 days of R eye pain, R sided headache and feeling unsteady, worse since 10 AM today, approx 12 hours prior to ER arrival. No vision changes, no jaw pain, no chest pain, no sob, no fevers, no abd pain, no n/v, no ear pain, no thunderclap headache. no falls or trauma. States NO room spinning, but does feel unsteady, mostly with movement of her head, difficult for patient to qualify but notes she does not feel this way when she is lying still. Different than her chronic dizziness she experiences. On exam, VS noted, HR regular no appreciable murmurs, abd soft, nontender, radial pulses equal and strong. no temporal artery tenderness, PERRL, visual fields full b/l. EOMI without nystagmus but with pain with eye movement and states unsteady feeling worsens when she looks to the left. NIHSS 0. Awake, alert, interactive. CN2-12 grossly intact except as otherwise noted, Strength 5/5 in upper and lower extremities. Sensation intact to light touch in upper and lower extremities. Gait WNL, finger-to-nose  WNL. Will get CT/CTA to eval for hemorrhagic CVA, dissection, less likely ischemic posterior stroke, possible complex migraine. low concern for GCA at this time, not consistent with angle closure glaucoma. Not consistent with an orbital cellulitis. Labs, CT/CTA, symptom control, neuro consult. will perform more thorough eye eval as well if imaging unremarkable

## 2022-08-13 NOTE — ED PROVIDER NOTE - NS ED ROS FT
GENERAL: No fever, chills  EYES: no vision changes, no discharge.   ENT: no difficulty swallowing or speaking   CARDIAC: no chest pain/pressure, SOB, lower extremity swelling  PULMONARY: no cough, SOB  GI: no abdominal pain, n/v/d  : no dysuria  SKIN: no rashes  NEURO: +headache,+ lightheadedness, no paresthesia  MSK: No joint pain, myalgia, weakness.

## 2022-08-13 NOTE — ED PROVIDER NOTE - PHYSICAL EXAMINATION
Sherri Doyle MD  GENERAL: Patient awake alert NAD.  HEENT: NC/AT, Moist mucous membranes, PERRL, EOMI.  LUNGS: CTAB, no wheezes or crackles.   CARDIAC: RRR, no m/r/g.    ABDOMEN: Soft, NT, ND, No rebound, guarding. No CVA tenderness.   EXT: No edema. No calf tenderness.   MSK: No spinal tenderness, no pain with movement, no deformities. no temporal tenderness  NEURO: A&Ox3. Moving all extremities. cn 2-12 intact. strength and sensation intact bilaterally.   SKIN: Warm and dry. No rash.  PSYCH: Normal affect.

## 2022-08-13 NOTE — ED PROVIDER NOTE - OBJECTIVE STATEMENT
Pt is a 65 yo F with no PMhx who presents with weakness and dizziness. Last known normal yesterday. Since 10am has been having general dizziness and whole body weakness. No L or R arm/leg numbness or weakness. No N/V, fevers, chills, CP, SOB. Has a right sided headache and R eye pain. no changes in vision. Denies changes in speech. is supposed to be on ASA since having covid several months ago but does not take

## 2022-08-14 DIAGNOSIS — R51.9 HEADACHE, UNSPECIFIED: ICD-10-CM

## 2022-08-14 LAB
A1C WITH ESTIMATED AVERAGE GLUCOSE RESULT: 5.4 % — SIGNIFICANT CHANGE UP (ref 4–5.6)
ALBUMIN SERPL ELPH-MCNC: 4.3 G/DL — SIGNIFICANT CHANGE UP (ref 3.3–5)
ALP SERPL-CCNC: 120 U/L — SIGNIFICANT CHANGE UP (ref 40–120)
ALT FLD-CCNC: 31 U/L — SIGNIFICANT CHANGE UP (ref 10–45)
ANION GAP SERPL CALC-SCNC: 12 MMOL/L — SIGNIFICANT CHANGE UP (ref 5–17)
APPEARANCE UR: CLEAR — SIGNIFICANT CHANGE UP
AST SERPL-CCNC: 30 U/L — SIGNIFICANT CHANGE UP (ref 10–40)
BASOPHILS # BLD AUTO: 0.05 K/UL — SIGNIFICANT CHANGE UP (ref 0–0.2)
BASOPHILS NFR BLD AUTO: 1.2 % — SIGNIFICANT CHANGE UP (ref 0–2)
BILIRUB SERPL-MCNC: 0.2 MG/DL — SIGNIFICANT CHANGE UP (ref 0.2–1.2)
BILIRUB UR-MCNC: NEGATIVE — SIGNIFICANT CHANGE UP
BUN SERPL-MCNC: 12 MG/DL — SIGNIFICANT CHANGE UP (ref 7–23)
CALCIUM SERPL-MCNC: 9.9 MG/DL — SIGNIFICANT CHANGE UP (ref 8.4–10.5)
CHLORIDE SERPL-SCNC: 105 MMOL/L — SIGNIFICANT CHANGE UP (ref 96–108)
CHOLEST SERPL-MCNC: 225 MG/DL — HIGH
CO2 SERPL-SCNC: 25 MMOL/L — SIGNIFICANT CHANGE UP (ref 22–31)
COLOR SPEC: COLORLESS — SIGNIFICANT CHANGE UP
CREAT SERPL-MCNC: 0.67 MG/DL — SIGNIFICANT CHANGE UP (ref 0.5–1.3)
DIFF PNL FLD: NEGATIVE — SIGNIFICANT CHANGE UP
EGFR: 98 ML/MIN/1.73M2 — SIGNIFICANT CHANGE UP
EOSINOPHIL # BLD AUTO: 0.38 K/UL — SIGNIFICANT CHANGE UP (ref 0–0.5)
EOSINOPHIL NFR BLD AUTO: 9 % — HIGH (ref 0–6)
ERYTHROCYTE [SEDIMENTATION RATE] IN BLOOD: 67 MM/HR — HIGH (ref 0–20)
ESTIMATED AVERAGE GLUCOSE: 108 MG/DL — SIGNIFICANT CHANGE UP (ref 68–114)
FOLATE SERPL-MCNC: 17.1 NG/ML — SIGNIFICANT CHANGE UP
GLUCOSE SERPL-MCNC: 106 MG/DL — HIGH (ref 70–99)
GLUCOSE UR QL: NEGATIVE — SIGNIFICANT CHANGE UP
HCT VFR BLD CALC: 36 % — SIGNIFICANT CHANGE UP (ref 34.5–45)
HDLC SERPL-MCNC: 47 MG/DL — LOW
HGB BLD-MCNC: 11.2 G/DL — LOW (ref 11.5–15.5)
IMM GRANULOCYTES NFR BLD AUTO: 0.5 % — SIGNIFICANT CHANGE UP (ref 0–1.5)
KETONES UR-MCNC: NEGATIVE — SIGNIFICANT CHANGE UP
LEUKOCYTE ESTERASE UR-ACNC: NEGATIVE — SIGNIFICANT CHANGE UP
LIPID PNL WITH DIRECT LDL SERPL: 142 MG/DL — HIGH
LYMPHOCYTES # BLD AUTO: 1.17 K/UL — SIGNIFICANT CHANGE UP (ref 1–3.3)
LYMPHOCYTES # BLD AUTO: 27.8 % — SIGNIFICANT CHANGE UP (ref 13–44)
MCHC RBC-ENTMCNC: 26.9 PG — LOW (ref 27–34)
MCHC RBC-ENTMCNC: 31.1 GM/DL — LOW (ref 32–36)
MCV RBC AUTO: 86.3 FL — SIGNIFICANT CHANGE UP (ref 80–100)
MONOCYTES # BLD AUTO: 0.46 K/UL — SIGNIFICANT CHANGE UP (ref 0–0.9)
MONOCYTES NFR BLD AUTO: 10.9 % — SIGNIFICANT CHANGE UP (ref 2–14)
NEUTROPHILS # BLD AUTO: 2.13 K/UL — SIGNIFICANT CHANGE UP (ref 1.8–7.4)
NEUTROPHILS NFR BLD AUTO: 50.6 % — SIGNIFICANT CHANGE UP (ref 43–77)
NITRITE UR-MCNC: NEGATIVE — SIGNIFICANT CHANGE UP
NON HDL CHOLESTEROL: 179 MG/DL — HIGH
NRBC # BLD: 0 /100 WBCS — SIGNIFICANT CHANGE UP (ref 0–0)
PH UR: 6 — SIGNIFICANT CHANGE UP (ref 5–8)
PLATELET # BLD AUTO: 229 K/UL — SIGNIFICANT CHANGE UP (ref 150–400)
POTASSIUM SERPL-MCNC: 3.9 MMOL/L — SIGNIFICANT CHANGE UP (ref 3.5–5.3)
POTASSIUM SERPL-SCNC: 3.9 MMOL/L — SIGNIFICANT CHANGE UP (ref 3.5–5.3)
PROT SERPL-MCNC: 7 G/DL — SIGNIFICANT CHANGE UP (ref 6–8.3)
PROT UR-MCNC: NEGATIVE — SIGNIFICANT CHANGE UP
RAPID RVP RESULT: SIGNIFICANT CHANGE UP
RBC # BLD: 4.17 M/UL — SIGNIFICANT CHANGE UP (ref 3.8–5.2)
RBC # FLD: 12.8 % — SIGNIFICANT CHANGE UP (ref 10.3–14.5)
SARS-COV-2 RNA SPEC QL NAA+PROBE: SIGNIFICANT CHANGE UP
SODIUM SERPL-SCNC: 142 MMOL/L — SIGNIFICANT CHANGE UP (ref 135–145)
SP GR SPEC: 1.01 — SIGNIFICANT CHANGE UP (ref 1.01–1.02)
T3 SERPL-MCNC: 99 NG/DL — SIGNIFICANT CHANGE UP (ref 80–200)
T4 AB SER-ACNC: 6.9 UG/DL — SIGNIFICANT CHANGE UP (ref 4.6–12)
TRIGL SERPL-MCNC: 181 MG/DL — HIGH
TSH SERPL-MCNC: 1.37 UIU/ML — SIGNIFICANT CHANGE UP (ref 0.27–4.2)
UROBILINOGEN FLD QL: NEGATIVE — SIGNIFICANT CHANGE UP
VIT B12 SERPL-MCNC: 735 PG/ML — SIGNIFICANT CHANGE UP (ref 232–1245)
WBC # BLD: 4.21 K/UL — SIGNIFICANT CHANGE UP (ref 3.8–10.5)
WBC # FLD AUTO: 4.21 K/UL — SIGNIFICANT CHANGE UP (ref 3.8–10.5)

## 2022-08-14 PROCEDURE — 70553 MRI BRAIN STEM W/O & W/DYE: CPT | Mod: 26

## 2022-08-14 PROCEDURE — 70543 MRI ORBT/FAC/NCK W/O &W/DYE: CPT | Mod: 26

## 2022-08-14 PROCEDURE — 99222 1ST HOSP IP/OBS MODERATE 55: CPT

## 2022-08-14 RX ORDER — ENOXAPARIN SODIUM 100 MG/ML
40 INJECTION SUBCUTANEOUS EVERY 24 HOURS
Refills: 0 | Status: DISCONTINUED | OUTPATIENT
Start: 2022-08-14 | End: 2022-08-16

## 2022-08-14 RX ADMIN — ENOXAPARIN SODIUM 40 MILLIGRAM(S): 100 INJECTION SUBCUTANEOUS at 11:44

## 2022-08-14 RX ADMIN — Medication 1 DROP(S): at 18:45

## 2022-08-14 NOTE — PHYSICAL THERAPY INITIAL EVALUATION ADULT - ADDITIONAL COMMENTS
Pt lives in a pvt home has 5 steps at entry +BHR was independent w/ all ADLs and functional mobility at baseline.

## 2022-08-14 NOTE — CONSULT NOTE ADULT - SUBJECTIVE AND OBJECTIVE BOX
[facility] DEPARTMENT OF OPHTHALMOLOGY - INITIAL ADULT CONSULT  -----------------------------------------------------------------------------------------------------------------  Darryn Tamayo MD  PGY 2  Contact on Teams  -----------------------------------------------------------------------------------------------------------------    HPI:  67 yo F with hx of dizziness (room spinning sensation), presents to the ED for pain in the Right head and numbness of the Right forehead, temporal area, and back of the neck. Patient reports she last felt well/felt at her baseline on Thursday. Since then, she has been having the right head pain, right head numbness, dizziness described as imbalance (different from her usual room-spinning sensation), R eye droop. Symptoms worsened today at 10AM. Stroke code was called in the ED at 9:46PM. NIHSS 0. Patient endorses pain of R eye on extraocular movement,     (14 Aug 2022 02:24)    Interval History: Ophtho consulted for eye pain. Patient states that the pain started behind both eyes about 1 week ago. Left eye pain improved and only right eye pain persisted. On thursday/friday pain started to worsen and she noticed increased pain with EOMs in any direction (other than down).   Patient reports no flashes, new floaters, change in vision, double vision.     PAST MEDICAL & SURGICAL HISTORY:  No pertinent past medical history      No significant past surgical history        Past Ocular History: None  Ophthalmic Medications: None  FAMILY HISTORY:  No pertinent family history in first degree relatives    MEDICATIONS  (STANDING):  enoxaparin Injectable 40 milliGRAM(s) SubCutaneous every 24 hours    MEDICATIONS  (PRN):    Allergies & Intolerances:     Review of Systems:  Constitutional: No fever, chills  Eyes: No blurry vision, flashes, floaters, FBS, erythema, discharge, double vision, OU  Neuro: No tremors  Cardiovascular: No chest pain, palpitations  Respiratory: No SOB, no cough  GI: No nausea, vomiting, abdominal pain  : No dysuria  Skin: no rash  Psych: no depression  Endocrine: no polyuria, polydipsia  Heme/lymph: no swelling    VITALS: T(C): 36.7 (08-14-22 @ 13:58)  T(F): 98 (08-14-22 @ 13:58), Max: 98.7 (08-14-22 @ 08:53)  HR: 69 (08-14-22 @ 13:58) (59 - 81)  BP: 109/71 (08-14-22 @ 13:58) (109/64 - 166/90)  RR:  (15 - 18)  SpO2:  (96% - 99%)  Wt(kg): --  General: AAO x 3, appropriate mood and affect    Ophthalmology Exam:  Visual acuity (cc): 20/20 OU  Pupils: PERRL OU, no APD  Ttono: 12 OU  Extraocular movements (EOMs): Full OU, no diplopia. Discomfort superior and lateral to right eye with eye movements.   Confrontational Visual Field (CVF): Full OU  Color Plates: 12/12 OU    Pen Light Exam (PLE)  External: Flat OU  Lids/Lashes/Lacrimal Ducts: Flat OU    Sclera/Conjunctiva: W+Q OU  Cornea: Cl OU  Anterior Chamber: D+F OU    Iris: Flat OU  Lens: Cl OU    Fundus Exam: dilated with 1% tropicamide and 2.5% phenylephrine  Approval obtained from primary team for dilation  Patient aware that pupils can remained dilated for at least 4-6 hours  Exam performed with 20D lens    Vitreous: wnl OU  Disc, cup/disc: sharp and pink, 0.2 OU  Macula: wnl OU  Vessels: wnl OU  Periphery: wnl OU    Labs/Imaging:    IMPRESSION:    CT PERFUSION: Normal perfusion..    CTA BRAIN: Patent intracranial circulation. No hemodynamically   significant proximal stenosis or aneurysm.    CTA NECK: Patent cervical vasculature. No hemodynamically significant   stenosis by NASCET criteria ordissection     [facility] DEPARTMENT OF OPHTHALMOLOGY - INITIAL ADULT CONSULT  -----------------------------------------------------------------------------------------------------------------  Darryn Tamayo MD  PGY 2  Contact on Teams  -----------------------------------------------------------------------------------------------------------------    HPI:  67 yo F with hx of dizziness (room spinning sensation), presents to the ED for pain in the Right head and numbness of the Right forehead, temporal area, and back of the neck. Patient reports she last felt well/felt at her baseline on Thursday. Since then, she has been having the right head pain, right head numbness, dizziness described as imbalance (different from her usual room-spinning sensation), R eye droop. Symptoms worsened today at 10AM. Stroke code was called in the ED at 9:46PM. NIHSS 0. Patient endorses pain of R eye on extraocular movement,     (14 Aug 2022 02:24)    Interval History: Ophtho consulted for eye pain. Patient states that the pain started behind both eyes about 1 week ago. Left eye pain improved and only right eye pain persisted. On thursday/friday pain started to worsen and she noticed increased pain with EOMs in any direction (other than down).   Patient reports no flashes, new floaters, change in vision, double vision.     PAST MEDICAL & SURGICAL HISTORY:  No pertinent past medical history      No significant past surgical history        Past Ocular History: None  Ophthalmic Medications: None  FAMILY HISTORY:  No pertinent family history in first degree relatives    MEDICATIONS  (STANDING):  enoxaparin Injectable 40 milliGRAM(s) SubCutaneous every 24 hours    MEDICATIONS  (PRN):    Allergies & Intolerances:     Review of Systems:  Constitutional: No fever, chills  Eyes: No blurry vision, flashes, floaters, FBS, erythema, discharge, double vision, OU  Neuro: No tremors  Cardiovascular: No chest pain, palpitations  Respiratory: No SOB, no cough  GI: No nausea, vomiting, abdominal pain  : No dysuria  Skin: no rash  Psych: no depression  Endocrine: no polyuria, polydipsia  Heme/lymph: no swelling    VITALS: T(C): 36.7 (08-14-22 @ 13:58)  T(F): 98 (08-14-22 @ 13:58), Max: 98.7 (08-14-22 @ 08:53)  HR: 69 (08-14-22 @ 13:58) (59 - 81)  BP: 109/71 (08-14-22 @ 13:58) (109/64 - 166/90)  RR:  (15 - 18)  SpO2:  (96% - 99%)  Wt(kg): --  General: AAO x 3, appropriate mood and affect    Ophthalmology Exam:  Visual acuity (cc): 20/20 OU  Pupils: PERRL OU, no APD  Ttono: 12 OU  Extraocular movements (EOMs): Full OU, no diplopia. Discomfort superior and lateral to right eye with eye movements.   Confrontational Visual Field (CVF): Full OU  Color Plates: 12/12 OU    Pen Light Exam (PLE)  External: Flat OU  Lids/Lashes/Lacrimal Ducts: Flat OU    Sclera/Conjunctiva: W+Q OU  Cornea: Cl OU  Anterior Chamber: D+F OU    Iris: Flat OU  Lens: Cl OU    Fundus Exam: dilated with 1% tropicamide and 2.5% phenylephrine  Approval obtained from primary team for dilation  Patient aware that pupils can remained dilated for at least 4-6 hours  Exam performed with 20D lens    Vitreous: wnl OU  Disc, cup/disc: sharp and pink, 0.2 OU  Macula: wnl OU  Vessels: wnl OU  Periphery: wnl OD, Few floaters inferotemporally OS    Labs/Imaging:    IMPRESSION:    CT PERFUSION: Normal perfusion..    CTA BRAIN: Patent intracranial circulation. No hemodynamically   significant proximal stenosis or aneurysm.    CTA NECK: Patent cervical vasculature. No hemodynamically significant   stenosis by NASCET criteria or dissection

## 2022-08-14 NOTE — H&P ADULT - NSHPREVIEWOFSYSTEMS_GEN_ALL_CORE
CONSTITUTIONAL: No fevers or chills  EYES/ENT: No visual changes or no throat pain   NECK: No  stiffness  RESPIRATORY: No hemoptysis or shortness of breath  CARDIOVASCULAR: No chest pain or palpitations  GASTROINTESTINAL: No melena or hematochezia  GENITOURINARY: No dysuria or hematuria  NEUROLOGICAL: +As stated in HPI above  SKIN: No itching, burning, rashes, or lesions

## 2022-08-14 NOTE — H&P ADULT - ASSESSMENT
Assessment: 67 yo F with hx of dizziness (room spinning sensation), presents to the ED for pain in the Right head and numbness of the Right forehead, temporal area, and back of the neck. Patient reports she last felt well/felt at her baseline on Thursday. Since then, she has been having the right head pain, right head numbness, dizziness described as imbalance (different from her usual room-spinning sensation), R eye droop. Symptoms worsened today at 10AM. Stroke code was called in the ED at 9:46PM. NIHSS 0. Patient endorses pain of R eye on extraocular movement,    Physical: pain of R eye with EOM in any direction.    NIHSS 0.  preMRS:0  LKN: 8/11/22    CTH and CTA unremarkable.    Case and plan discussed with stroke fellow. No tPA given NIHSS 0. No MT given no LVO.    Impression: pain of R eye with EOM in any direction, Right side periorbital headache with numbness of R forehead, R posterior head, suggestive of intraorbital/extraocular muscle etiology vs migraine amongst other differentials    Recommendations:  [] MRI head with and without contrast  [] MRI orbits  [] opthalmology evaluation  [] pain management, can consider IV tylenol, IV magnesium      Recommendations not final until attested by attending.  Patient will be seen on AM rounds

## 2022-08-14 NOTE — H&P ADULT - HISTORY OF PRESENT ILLNESS
67 yo F with hx of dizziness (room spinning sensation), presents to the ED for pain in the Right head and numbness of the Right forehead, temporal area, and back of the neck. Patient reports she last felt well/felt at her baseline on Thursday. Since then, she has been having the right head pain, right head numbness, dizziness described as imbalance (different from her usual room-spinning sensation), R eye droop. Symptoms worsened today at 10AM. Stroke code was called in the ED at 9:46PM. NIHSS 0. Patient endorses pain of R eye on extraocular movement,

## 2022-08-14 NOTE — CONSULT NOTE ADULT - SUBJECTIVE AND OBJECTIVE BOX
Neurology - Consult Note - 08-14-22  -  Carolyn Springer MD  PGY-2 Neurology  Spectra: [phone] ([facility]), [phone] ([facility])  -    Name: LUISITO RAMÍREZ; 66y (1955)    Reason for Admission:     Chief Complaint: 67 yo F p/w R eye pain    HPI:  67 yo F with hx of dizziness (room spinning sensation), presents to the ED for pain in the Right head and numbness of the Right forehead, temporal area, and back of the neck. Patient reports she last felt well/felt at her baseline on Thursday. Since then, she has been having the right head pain, right head numbness, dizziness described as imbalance (different from her usual room-spinning sensation), R eye droop. Symptoms worsened today at 10AM. Stroke code was called in the ED at 9:46PM. NIHSS 0. Patient endorses pain of R eye on extraocular movement,      Review of Systems:    CONSTITUTIONAL: No fevers or chills  EYES/ENT: No visual changes or no throat pain   NECK: No  stiffness  RESPIRATORY: No hemoptysis or shortness of breath  CARDIOVASCULAR: No chest pain or palpitations  GASTROINTESTINAL: No melena or hematochezia  GENITOURINARY: No dysuria or hematuria  NEUROLOGICAL: +As stated in HPI above  SKIN: No itching, burning, rashes, or lesions      Allergies:  NKDA    PMHx:   No pertinent past medical history      PFHx: NKFH    PSuHx:   No significant past surgical history        Medications:  MEDICATIONS  (STANDING):    MEDICATIONS  (PRN):      Vitals:  T(C): 36.4 (08-13-22 @ 23:45), Max: 36.8 (08-13-22 @ 21:36)  HR: 76 (08-13-22 @ 23:45) (76 - 81)  BP: 125/89 (08-13-22 @ 23:45) (125/89 - 166/90)  RR: 18 (08-13-22 @ 23:45) (18 - 18)  SpO2: 99% (08-13-22 @ 23:45) (98% - 99%)    Physical Examination:        Otherwise denies fever, chills, headaches, vision changes, blurry vision, double vision, nausea, vomiting, hearing change, focal weakness, focal numbness, parasthesias, bowel/ bladder incontinence.         PAST MEDICAL & SURGICAL HISTORY:  No pertinent past medical history      No significant past surgical history        FAMILY HISTORY:    SOCIAL HISTORY: no smoking, alcohol, drug use hx    MEDICATIONS (HOME):  Home Medications:    MEDICATIONS  (STANDING):    MEDICATIONS  (PRN):    ALLERGIES/INTOLERANCES:  Allergies  No Known Allergies    Intolerances    VITALS & EXAMINATION:  Vital Signs Last 24 Hrs  T(C): 36.4 (13 Aug 2022 23:45), Max: 36.8 (13 Aug 2022 21:36)  T(F): 97.6 (13 Aug 2022 23:45), Max: 98.2 (13 Aug 2022 21:36)  HR: 76 (13 Aug 2022 23:45) (76 - 81)  BP: 125/89 (13 Aug 2022 23:45) (125/89 - 166/90)  BP(mean): --  RR: 18 (13 Aug 2022 23:45) (18 - 18)  SpO2: 99% (13 Aug 2022 23:45) (98% - 99%)    Parameters below as of 13 Aug 2022 23:45  Patient On (Oxygen Delivery Method): room air        General:  Constitutional: Female, appears stated age   Head: Normocephalic; Eyes: clear sclera;   Extremities: No cyanosis; Skin: No matt edema of LE  Resp: breathing comfortably     Neurological (>12):  MS: Awake, alert.  Follows commands. Attends to examiner  Language: Speech is clear, fluent, normal volume, good repetition,  comprehension, registration of words.  CNs: PERRL (R 3mm, L 3mm). VFF. EOMI but complains of pain with R eye movement in all directions. V1-3 intact LT, No facial asymmetry b/l, full. Hearing grossly normal (rubbing fingers) b/l. Tongue midline.     Motor - Normal bulk and tone throughout. No pronator drift.    L/R         Deltoid  5/5    Biceps   5/5      Triceps  5/5         Wrist Extension 5/5   Wrist Flexion  5/5      5/5   L/R         Hip Flexion  5/5    Hip Extension  5/5  Knee Extension  5/5  Dorsiflexion  5/5      Plantar Flexion 5/5     Sensation: Intact to LT b/l. Cortical: Extinction on DSS (neglect): none  Reflexes L/R:  Biceps(C5) 3/3  BR(C6) 3/3  Triceps(C7)  3/3  Patellar(L4)   2/2   Toes: mute  Coordination: No dysmetria to FTN b/l UE  Gait: Normal Romberg. No postural instability. Normal stance.    LABORATORY:  CBC                       11.9   4.96  )-----------( 252      ( 13 Aug 2022 21:58 )             37.7     Chem 08-13    140  |  102  |  15  ----------------------------<  99  3.9   |  26  |  0.83    Ca    10.4      13 Aug 2022 21:58    TPro  7.6  /  Alb  4.8  /  TBili  0.2  /  DBili  x   /  AST  30  /  ALT  33  /  AlkPhos  134<H>  08-13    LFTs LIVER FUNCTIONS - ( 13 Aug 2022 21:58 )  Alb: 4.8 g/dL / Pro: 7.6 g/dL / ALK PHOS: 134 U/L / ALT: 33 U/L / AST: 30 U/L / GGT: x           Coagulopathy PT/INR - ( 13 Aug 2022 21:58 )   PT: 11.8 sec;   INR: 1.02 ratio         PTT - ( 13 Aug 2022 21:58 )  PTT:30.2 sec  Lipid Panel   A1c   Cardiac enzymes     U/A   CSF  Other    STUDIES & IMAGING: (EEG, CT, MR, U/S, TTE/JJ):      Labs:                        11.9   4.96  )-----------( 252      ( 13 Aug 2022 21:58 )             37.7     08-13    140  |  102  |  15  ----------------------------<  99  3.9   |  26  |  0.83    Ca    10.4      13 Aug 2022 21:58    TPro  7.6  /  Alb  4.8  /  TBili  0.2  /  DBili  x   /  AST  30  /  ALT  33  /  AlkPhos  134<H>  08-13    CAPILLARY BLOOD GLUCOSE      POCT Blood Glucose.: 106 mg/dL (13 Aug 2022 21:39)    LIVER FUNCTIONS - ( 13 Aug 2022 21:58 )  Alb: 4.8 g/dL / Pro: 7.6 g/dL / ALK PHOS: 134 U/L / ALT: 33 U/L / AST: 30 U/L / GGT: x             PT/INR - ( 13 Aug 2022 21:58 )   PT: 11.8 sec;   INR: 1.02 ratio         PTT - ( 13 Aug 2022 21:58 )  PTT:30.2 sec  CSF:        Radiology:    There is no acute intracranial hemorrhage, vasogenic edema, extra-axial collection or evidence for acute large vascular territory infarct.    The ventricles, sulci and cisternal spaces are normal in size and configuration for patient's stated age. There is no midline shift.    The visualized paranasal sinuses and mastoid air cells are clear. The orbits are within normal limits. The calvarium is intact.    IMPRESSION:  No acute intra-cranial hemorrhage, vasogenic edema, hydrocephalus or extra-axial collection. Unremarkable noncontrast head CT.      CT PERFUSION: Normal perfusion..    CTA BRAIN: Patent intracranial circulation. No hemodynamically significant proximal stenosis or aneurysm.    CTA NECK: Patent cervical vasculature. No hemodynamically significant stenosis by NASCET criteria or dissection

## 2022-08-14 NOTE — CONSULT NOTE ADULT - ATTENDING COMMENTS
Patient seen and examined - here with a few days of right sided head pain - behind the eye and a sense of drooping of her right eyelid. She denies any past medical or surgical history. No visual change - no similar issue on the left, Denies any weakness or gait change.    On exam, she is awake and alert - fully oriented and fluent.  T 98.4, P 60, /72  No temporal artery tenderness to palpation bilaterally  ? smaller palpebral fissure on the right  PERRL, EOMI and VFF  Face is otherwise symmetric  Tone is normal  No drift  FFM are equal  Strength full in UE and LE  Reflexes all present with toes downgoing.    Agree with recommendation of MRI brain and orbits and ophthalmology evaluation - ESR and CRP elevated - if above negative would consider temporal arteritis.

## 2022-08-14 NOTE — OCCUPATIONAL THERAPY INITIAL EVALUATION ADULT - PERTINENT HX OF CURRENT PROBLEM, REHAB EVAL
67 yo F with hx of dizziness (room spinning sensation), presents to the ED for pain in the Right head and numbness of the Right forehead, temporal area, and back of the neck. Pt with pain of R eye with EOM in any direction, Right side periorbital headache with numbness of R forehead, R posterior head, suggestive of intraorbital/extraocular muscle etiology vs migraine amongst other differentials. CT head negative. NIHSS 0.

## 2022-08-14 NOTE — H&P ADULT - NSHPPHYSICALEXAM_GEN_ALL_CORE
General:  Constitutional: Female, appears stated age   Head: Normocephalic; Eyes: clear sclera;   Extremities: No cyanosis; Skin: No matt edema of LE  Resp: breathing comfortably     Neurological (>12):  MS: Awake, alert.  Follows commands. Attends to examiner  Language: Speech is clear, fluent, normal volume, good repetition,  comprehension, registration of words.  CNs: PERRL (R 3mm, L 3mm). VFF. EOMI but complains of pain with R eye movement in all directions. V1-3 intact LT, No facial asymmetry b/l, full. Hearing grossly normal (rubbing fingers) b/l. Tongue midline.     Motor - Normal bulk and tone throughout. No pronator drift.    L/R         Deltoid  5/5    Biceps   5/5      Triceps  5/5         Wrist Extension 5/5   Wrist Flexion  5/5      5/5   L/R         Hip Flexion  5/5    Hip Extension  5/5  Knee Extension  5/5  Dorsiflexion  5/5      Plantar Flexion 5/5     Sensation: Intact to LT b/l. Cortical: Extinction on DSS (neglect): none  Reflexes L/R:  Biceps(C5) 3/3  BR(C6) 3/3  Triceps(C7)  3/3  Patellar(L4)   2/2   Toes: mute  Coordination: No dysmetria to FTN b/l UE  Gait: Normal Romberg. No postural instability. Normal stance.

## 2022-08-14 NOTE — PATIENT PROFILE ADULT - FALL HARM RISK - UNIVERSAL INTERVENTIONS
Bed in lowest position, wheels locked, appropriate side rails in place/Call bell, personal items and telephone in reach/Instruct patient to call for assistance before getting out of bed or chair/Non-slip footwear when patient is out of bed/[place] to call system/Physically safe environment - no spills, clutter or unnecessary equipment/Purposeful Proactive Rounding/Room/bathroom lighting operational, light cord in reach

## 2022-08-14 NOTE — H&P ADULT - NSHPLABSRESULTS_GEN_ALL_CORE
Radiology:    There is no acute intracranial hemorrhage, vasogenic edema, extra-axial collection or evidence for acute large vascular territory infarct.    The ventricles, sulci and cisternal spaces are normal in size and configuration for patient's stated age. There is no midline shift.    The visualized paranasal sinuses and mastoid air cells are clear. The orbits are within normal limits. The calvarium is intact.    IMPRESSION:  No acute intra-cranial hemorrhage, vasogenic edema, hydrocephalus or extra-axial collection. Unremarkable noncontrast head CT.      CT PERFUSION: Normal perfusion..    CTA BRAIN: Patent intracranial circulation. No hemodynamically significant proximal stenosis or aneurysm.    CTA NECK: Patent cervical vasculature. No hemodynamically significant stenosis by NASCET criteria or dissection

## 2022-08-14 NOTE — CONSULT NOTE ADULT - ASSESSMENT
Assessment and Recommendations:  66y female with no past medical history/ocular history consulted for 1 week of pain behind the right eye that is worsened with eye movements Patient found to have normal ocular exam. Patient with vision 20/20 right eye, 20/20 left eye, pupils equal, round and reactive, intraocular pressure within normal limits, extraocular movements, and confrontational visual fields, and color full both eyes. On dilated fundus exam no tears, holes, or detachments with 0.2 cup/disc ratios.    #Pain behind right eye worsened with eye movements.    - No visual changes, intraocular pressure normal, CTA head/neck normal, no periorbital swelling    - unclear etiology   - F/u MRI head/orbits   - Can try preservative free artificial tears 4x per day both eyes    Outpatient Follow-up: Patient should follow-up with his/her ophthalmologist or with [facility] Department of Ophthalmology within 1 week of after discharge at:    [address]  [place] [address]  [phone]    Darryn Tamayo MD, PGY-2  Also available on [employer] Teams    
Assessment: 67 yo F with hx of dizziness (room spinning sensation), presents to the ED for pain in the Right head and numbness of the Right forehead, temporal area, and back of the neck. Patient reports she last felt well/felt at her baseline on Thursday. Since then, she has been having the right head pain, right head numbness, dizziness described as imbalance (different from her usual room-spinning sensation), R eye droop. Symptoms worsened today at 10AM. Stroke code was called in the ED at 9:46PM. NIHSS 0. Patient endorses pain of R eye on extraocular movement,    Physical: pain of R eye with EOM in any direction.    NIHSS 0.  preMRS:0  LKN: 8/11/22    CTH and CTA unremarkable.    Case and plan discussed with stroke fellow. No tPA given NIHSS 0. No MT given no LVO.    Impression: pain of R eye with EOM in any direction, Right side periorbital headache with numbness of R forehead, R posterior head, suggestive of intraorbital/extraocular muscle etiology vs migraine amongst other differentials    Recommendations:  [] MRI head with and without contrast  [] MRI orbits  [] opthalmology evaluation  [] pain management, can consider IV tylenol, IV magnesium      Recommendations not final until attested by attending.  Patient will be seen on AM rounds

## 2022-08-14 NOTE — PHYSICAL THERAPY INITIAL EVALUATION ADULT - PERTINENT HX OF CURRENT PROBLEM, REHAB EVAL
65 yo F with hx of dizziness (room spinning sensation), presents to the ED for pain in the Right head and numbness of the Right forehead, temporal area, and back of the neck. Patient reports she last felt well/felt at her baseline on Thursday. Since then, she has been having the right head pain, right head numbness, dizziness described as imbalance (different from her usual room-spinning sensation), R eye droop. Pain of R eye with EOM in any direction, Right side periorbital headache with numbness of R forehead, R posterior head, suggestive of intraorbital/extraocular muscle etiology vs migraine amongst other differentials. CT PERFUSION: Normal perfusion. CTA BRAIN: Patent intracranial circulation. No hemodynamically significant proximal stenosis or aneurysm. CTA NECK: Patent cervical vasculature. No hemodynamically significant stenosis by NASCET criteria or dissection.

## 2022-08-15 ENCOUNTER — TRANSCRIPTION ENCOUNTER (OUTPATIENT)
Age: 67
End: 2022-08-15

## 2022-08-15 LAB
ANION GAP SERPL CALC-SCNC: 13 MMOL/L — SIGNIFICANT CHANGE UP (ref 5–17)
BASOPHILS # BLD AUTO: 0.07 K/UL — SIGNIFICANT CHANGE UP (ref 0–0.2)
BASOPHILS NFR BLD AUTO: 1.5 % — SIGNIFICANT CHANGE UP (ref 0–2)
BUN SERPL-MCNC: 16 MG/DL — SIGNIFICANT CHANGE UP (ref 7–23)
CALCIUM SERPL-MCNC: 9.8 MG/DL — SIGNIFICANT CHANGE UP (ref 8.4–10.5)
CHLORIDE SERPL-SCNC: 103 MMOL/L — SIGNIFICANT CHANGE UP (ref 96–108)
CO2 SERPL-SCNC: 24 MMOL/L — SIGNIFICANT CHANGE UP (ref 22–31)
CREAT SERPL-MCNC: 0.78 MG/DL — SIGNIFICANT CHANGE UP (ref 0.5–1.3)
EGFR: 85 ML/MIN/1.73M2 — SIGNIFICANT CHANGE UP
EOSINOPHIL # BLD AUTO: 0.41 K/UL — SIGNIFICANT CHANGE UP (ref 0–0.5)
EOSINOPHIL NFR BLD AUTO: 8.9 % — HIGH (ref 0–6)
GLUCOSE BLDC GLUCOMTR-MCNC: 166 MG/DL — HIGH (ref 70–99)
GLUCOSE SERPL-MCNC: 109 MG/DL — HIGH (ref 70–99)
HCT VFR BLD CALC: 40 % — SIGNIFICANT CHANGE UP (ref 34.5–45)
HCV AB S/CO SERPL IA: 0.1 S/CO — SIGNIFICANT CHANGE UP (ref 0–0.99)
HCV AB SERPL-IMP: SIGNIFICANT CHANGE UP
HGB BLD-MCNC: 12.3 G/DL — SIGNIFICANT CHANGE UP (ref 11.5–15.5)
IMM GRANULOCYTES NFR BLD AUTO: 0.4 % — SIGNIFICANT CHANGE UP (ref 0–1.5)
LYMPHOCYTES # BLD AUTO: 1.29 K/UL — SIGNIFICANT CHANGE UP (ref 1–3.3)
LYMPHOCYTES # BLD AUTO: 27.9 % — SIGNIFICANT CHANGE UP (ref 13–44)
MAGNESIUM SERPL-MCNC: 1.9 MG/DL — SIGNIFICANT CHANGE UP (ref 1.6–2.6)
MCHC RBC-ENTMCNC: 26.7 PG — LOW (ref 27–34)
MCHC RBC-ENTMCNC: 30.8 GM/DL — LOW (ref 32–36)
MCV RBC AUTO: 86.8 FL — SIGNIFICANT CHANGE UP (ref 80–100)
MONOCYTES # BLD AUTO: 0.51 K/UL — SIGNIFICANT CHANGE UP (ref 0–0.9)
MONOCYTES NFR BLD AUTO: 11 % — SIGNIFICANT CHANGE UP (ref 2–14)
NEUTROPHILS # BLD AUTO: 2.33 K/UL — SIGNIFICANT CHANGE UP (ref 1.8–7.4)
NEUTROPHILS NFR BLD AUTO: 50.3 % — SIGNIFICANT CHANGE UP (ref 43–77)
NRBC # BLD: 0 /100 WBCS — SIGNIFICANT CHANGE UP (ref 0–0)
PHOSPHATE SERPL-MCNC: 3.5 MG/DL — SIGNIFICANT CHANGE UP (ref 2.5–4.5)
PLATELET # BLD AUTO: 240 K/UL — SIGNIFICANT CHANGE UP (ref 150–400)
POTASSIUM SERPL-MCNC: 4 MMOL/L — SIGNIFICANT CHANGE UP (ref 3.5–5.3)
POTASSIUM SERPL-SCNC: 4 MMOL/L — SIGNIFICANT CHANGE UP (ref 3.5–5.3)
RBC # BLD: 4.61 M/UL — SIGNIFICANT CHANGE UP (ref 3.8–5.2)
RBC # FLD: 12.8 % — SIGNIFICANT CHANGE UP (ref 10.3–14.5)
SODIUM SERPL-SCNC: 140 MMOL/L — SIGNIFICANT CHANGE UP (ref 135–145)
WBC # BLD: 4.63 K/UL — SIGNIFICANT CHANGE UP (ref 3.8–10.5)
WBC # FLD AUTO: 4.63 K/UL — SIGNIFICANT CHANGE UP (ref 3.8–10.5)

## 2022-08-15 PROCEDURE — 99232 SBSQ HOSP IP/OBS MODERATE 35: CPT

## 2022-08-15 PROCEDURE — 99231 SBSQ HOSP IP/OBS SF/LOW 25: CPT

## 2022-08-15 RX ORDER — INSULIN LISPRO 100/ML
VIAL (ML) SUBCUTANEOUS
Refills: 0 | Status: DISCONTINUED | OUTPATIENT
Start: 2022-08-15 | End: 2022-08-17

## 2022-08-15 RX ORDER — DEXTROSE 50 % IN WATER 50 %
12.5 SYRINGE (ML) INTRAVENOUS ONCE
Refills: 0 | Status: DISCONTINUED | OUTPATIENT
Start: 2022-08-15 | End: 2022-08-17

## 2022-08-15 RX ORDER — INSULIN LISPRO 100/ML
VIAL (ML) SUBCUTANEOUS AT BEDTIME
Refills: 0 | Status: DISCONTINUED | OUTPATIENT
Start: 2022-08-15 | End: 2022-08-17

## 2022-08-15 RX ORDER — GLUCAGON INJECTION, SOLUTION 0.5 MG/.1ML
1 INJECTION, SOLUTION SUBCUTANEOUS ONCE
Refills: 0 | Status: DISCONTINUED | OUTPATIENT
Start: 2022-08-15 | End: 2022-08-17

## 2022-08-15 RX ORDER — SODIUM CHLORIDE 9 MG/ML
1000 INJECTION, SOLUTION INTRAVENOUS
Refills: 0 | Status: DISCONTINUED | OUTPATIENT
Start: 2022-08-15 | End: 2022-08-17

## 2022-08-15 RX ORDER — DEXTROSE 50 % IN WATER 50 %
25 SYRINGE (ML) INTRAVENOUS ONCE
Refills: 0 | Status: DISCONTINUED | OUTPATIENT
Start: 2022-08-15 | End: 2022-08-17

## 2022-08-15 RX ORDER — PANTOPRAZOLE SODIUM 20 MG/1
40 TABLET, DELAYED RELEASE ORAL DAILY
Refills: 0 | Status: DISCONTINUED | OUTPATIENT
Start: 2022-08-15 | End: 2022-08-17

## 2022-08-15 RX ORDER — DEXTROSE 50 % IN WATER 50 %
15 SYRINGE (ML) INTRAVENOUS ONCE
Refills: 0 | Status: DISCONTINUED | OUTPATIENT
Start: 2022-08-15 | End: 2022-08-17

## 2022-08-15 RX ADMIN — Medication 1 DROP(S): at 11:36

## 2022-08-15 RX ADMIN — Medication 58 MILLIGRAM(S): at 18:23

## 2022-08-15 RX ADMIN — Medication 1 DROP(S): at 18:24

## 2022-08-15 RX ADMIN — Medication 1 DROP(S): at 00:00

## 2022-08-15 RX ADMIN — Medication 1 DROP(S): at 06:03

## 2022-08-15 RX ADMIN — ENOXAPARIN SODIUM 40 MILLIGRAM(S): 100 INJECTION SUBCUTANEOUS at 11:35

## 2022-08-15 NOTE — PROGRESS NOTE ADULT - ASSESSMENT
66 year-old woman with no reported PMH who presented on 8/13 with c/o right sided temporal and posterior neck headache described as a pressure like sensation, as well as right eye pain with eye movements and possible drooping of the right eyelid. VS on presentation notable for /90, otherwise wnl. Initial labs significant for ESR 67, CRP 20. CTH w/o, CTA H/N unrevealing. MRI brain w/wo negative for acute pathology or abnormal enhancement. MRI orbits w/wo reported to demonstrate abnormal signal and enhancement w/in the intraorbital right optic nerve.     Impression: Right sided headache and ocular pain OD with eye movements with findings on MRI consistent with active optic neuritis.     Plan:   [x] MRI head w/wo 8/14  [x] MRI orbits w/wo 8/14  [/] Start IV Solumedrol 1000mg for 3-5 days (8/15-) pending symptom improvement  [/] Serum studies: ESR (67), CRP (20)  --> Pending: DEVIKA, SSA/SSB, RF, DsDNA, ANCA, Lyme, Syphilis, Ig4, NMO, MOG, TB, ACE  [] Will discuss utility of LP with attending in AM  [x] Appreciate Ophthalmology evaluation  [x] Pain management, can consider IV tylenol, IV magnesium  [x] PT/OT 8/14: no skilled needs  ---  - DVT ppx: lovenox  - Diet: regular  - ISS & POCT TIDAC & HS while on IV steroids  - GI ppx while on IV steroids    Seen and discussed with Neurology attending, Dr. Garrison.

## 2022-08-15 NOTE — PROGRESS NOTE ADULT - ASSESSMENT
Assessment and Recommendations:  66y female w/ no pmhx/ochx consulted for pain along R side of head with EOM, found to have R optic neuritis on MRI imaging.     1. R optic neuritis.   - Pt denies ocular pain with eye movements, rather pain is along R side of head  - GCA ROS negative: denies temporal headache, jaw claudication, scalp tenderness, muscle aches, fevers, chills, weight loss. Temporal pulses strong b/l.  - MRI orbits showing optic neuritis as above - reviewed with neuro-ophthalmologist  - Appears to be somewhat of an atypical case. Pt has no visual acuity loss, no APD, no color plate deficit, no red desaturation, no CVF loss.   - Solumedrol per neurology  - Obtain NMO and MOG antibodies  - ESR 67, CRP 20, likely elevated in setting of optic neuritis  - Decision re: LP per neurology.      Outpatient follow-up: Patient should follow-up with his/her ophthalmologist or with [facility] Department of Ophthalmology at the address below     [address] [place] [address]. [address]  [place] [address]  [phone]    Case seen and discussed with Dr. Willett, attending. To be discussed with Dr. Nino, neuro-ophthalmologist.     Vanda CABRERA Rai, MD  PGY-3, Ophthalmology  [phone]    Also available on [employer] Teams. Assessment and Recommendations:  66y female w/ no pmhx/ochx consulted for pain along R side of head with EOM, found to have R optic neuritis on MRI imaging.     1. R head/orbital pain   - Pt denies ocular pain with eye movements, rather pain is along R side of head  - GCA ROS negative: denies temporal headache, jaw claudication, scalp tenderness, muscle aches, fevers, chills, weight loss. Temporal pulses strong b/l.  Low suspicion for GCA as ROS is negative.  - MRI orbits showing optic neuritis as above - further review with neuro-ophtho Dr. Nino, based on symptoms, eye exam findings, and imaging, diagnosis more suggestive of an optic perineuritis/IOIS.  Pt also has elevated ESR and CRP that suggest an inflammatory process.  - Appears to be somewhat of an atypical case. Pt has no visual acuity loss, no APD, no color plate deficit, no red desaturation, no CVF loss.   - Solumedrol and GI prophylaxis per neurology  - would recommend check RPR, FTA, ACE, lysosyme, ANCA, IgG4, DEVIKA  - findings and plan discussed with patient, primary team, and neuro-ophtho, plan per neuro/primary team/neuro ophtho  - will follow    Outpatient follow-up: Patient should follow-up with his/her ophthalmologist or with [facility] Department of Neuro-Ophthalmology at the address below within 1 week of discharge, sooner if symptoms worsen or change    [address]  [place] [address]  [phone]    Case seen and discussed with Dr. Willett, attending. Discussed with Dr. Nino, neuro-ophthalmologist.     Vanda CABRERA Rai, MD  PGY-3, Ophthalmology  [phone]    Also available on [employer] Teams.

## 2022-08-15 NOTE — DISCHARGE NOTE PROVIDER - HOSPITAL COURSE
HPI:  67 yo F with hx of dizziness (room spinning sensation), presents to the ED for pain in the Right head and numbness of the Right forehead, temporal area, and back of the neck. Patient reports she last felt well/felt at her baseline on Thursday. Since then, she has been having the right head pain, right head numbness, dizziness described as imbalance (different from her usual room-spinning sensation), R eye droop. Symptoms worsened today at 10AM. Stroke code was called in the ED at 9:46PM. NIHSS 0. Patient endorses pain of R eye on extraocular movement,     (14 Aug 2022 02:24)      IMAGING/STUDIES    CT Brain Perfusion Maps Stroke (08.13.22 @ 22:07)     CT PERFUSION: Normal perfusion..    CTA BRAIN: Patent intracranial circulation. No hemodynamically   significant proximal stenosis or aneurysm.    CTA NECK: Patent cervical vasculature. No hemodynamically significant   stenosis by NASCET criteria ordissection        CT Brain Stroke Protocol (08.13.22 @ 22:07)     IMPRESSION:  No acute intra-cranial hemorrhage, vasogenic edema, hydrocephalus or   extra-axial collection. Unremarkable noncontrast head CT.        HOSPITAL COURSE:     HPI:  67 yo F with hx of dizziness (room spinning sensation), presents to the ED for pain in the Right head and numbness of the Right forehead, temporal area, and back of the neck. Patient reports she last felt well/felt at her baseline on Thursday. Since then, she has been having the right head pain, right head numbness, dizziness described as imbalance (different from her usual room-spinning sensation), R eye droop. Symptoms worsened today at 10AM. Stroke code was called in the ED at 9:46PM. NIHSS 0. Patient endorses pain of R eye on extraocular movement,     (14 Aug 2022 02:24)      IMAGING/STUDIES:    CT Brain Perfusion Maps Stroke (08.13.22 @ 22:07): Normal perfusion..    CTA BRAIN: Patent intracranial circulation. No hemodynamically significant proximal stenosis or aneurysm.    CTA NECK: Patent cervical vasculature. No hemodynamically significant stenosis by NASCET criteria ordissection    CT Brain Stroke Protocol (08.13.22 @ 22:07): No acute intra-cranial hemorrhage, vasogenic edema, hydrocephalus or extra-axial collection. Unremarkable noncontrast head CT.    MR Brain & Orbits w/wo IV Cont (08.14.22 @ 22:46)   MRI BRAIN:  No hydrocephalus, mass effect, acute intracranial hemorrhage, vasogenic edema, or acute territorial infarct.  No abnormal parenchymal or leptomeningeal enhancement.    MRI ORBITS:  Abnormal signal and enhancement within the intraorbital right optic nerve, consistent with the presence of optic neuritis. Differential diagnosis includes demyelinating disease, infection such as Lyme disease, as well as inflammatory and autoimmune disorders.    Serum studies: ESR (67), CRP (20), lyme (-), syphilis (-)  --> Pending: DEVIKA, SSA/SSB, RF, dsDNA, ANCA, Ig4, NMO, MOG, TB, ACE      HOSPITAL COURSE:  The patient was admitted to the General Neurology service for further evaluation and management. MRI of the brain and orbits w/wo contrast was performed and demonstrated enhancement within the intraorbital right optic nerve, consistent with optic neuritis. She was treated with a 3 day course of IV solumedrol 1g daily, with significant improvement in symptoms. Serum workup was sent, with some results pending as above. She did not want an LP performed during the admission, but will follow up in the outpatient setting with Neuroimmunology, Dr. Garrison, for further evaluation and management. She was evaluated by PT and OT, and did not receive recommendations for skilled PT/OT services. She has remained stable and is cleared for discharge home at this time.    HPI:  67 yo F with hx of dizziness (room spinning sensation), presents to the ED for pain in the Right head and numbness of the Right forehead, temporal area, and back of the neck. Patient reports she last felt well/felt at her baseline on Thursday. Since then, she has been having the right head pain, right head numbness, dizziness described as imbalance (different from her usual room-spinning sensation), R eye droop. Symptoms worsened today at 10AM. Stroke code was called in the ED at 9:46PM. NIHSS 0. Patient endorses pain of R eye on extraocular movement,     (14 Aug 2022 02:24)      IMAGING/STUDIES:    CT Brain Perfusion Maps Stroke (08.13.22 @ 22:07): Normal perfusion..    CTA BRAIN: Patent intracranial circulation. No hemodynamically significant proximal stenosis or aneurysm.    CTA NECK: Patent cervical vasculature. No hemodynamically significant stenosis by NASCET criteria ordissection    CT Brain Stroke Protocol (08.13.22 @ 22:07): No acute intra-cranial hemorrhage, vasogenic edema, hydrocephalus or extra-axial collection. Unremarkable noncontrast head CT.    MR Brain & Orbits w/wo IV Cont (08.14.22 @ 22:46)   MRI BRAIN:  No hydrocephalus, mass effect, acute intracranial hemorrhage, vasogenic edema, or acute territorial infarct.  No abnormal parenchymal or leptomeningeal enhancement.    MRI ORBITS:  Abnormal signal and enhancement within the intraorbital right optic nerve, consistent with the presence of optic neuritis. Differential diagnosis includes demyelinating disease, infection such as Lyme disease, as well as inflammatory and autoimmune disorders.    Serum studies: ESR (67), CRP (20), lyme (-), syphilis (-)  --> Pending: DEVIKA, SSA/SSB, RF, dsDNA, ANCA, Ig4, NMO, MOG, TB, ACE      HOSPITAL COURSE:  The patient was admitted to the General Neurology service for further evaluation and management. MRI of the brain and orbits w/wo contrast was performed and demonstrated enhancement within the intraorbital right optic nerve, consistent with optic neuritis. She was treated with a 3 day course of IV solumedrol 1g daily, with significant improvement in symptoms. She will be discharged on a 12 day oral steroid taper, with instructions to take 60mg, 40mg and 20mg for 4 days each. Serum workup was sent, with some results pending as above. She did not want an LP performed during the admission, but will follow up in the outpatient setting with Neuroimmunology, Dr. Garrison, for further evaluation and management. She was evaluated by PT and OT, and did not receive recommendations for skilled PT/OT services. She has remained stable and is cleared for discharge home at this time.

## 2022-08-15 NOTE — PROGRESS NOTE ADULT - SUBJECTIVE AND OBJECTIVE BOX
SUBJECTIVE/INTERVAL HISTORY:      ROS: As above, otherwise negative.     FHx: Non-contributory        MEDICATIONS:  MEDICATIONS  (STANDING):  artificial tears (preservative free) Ophthalmic Solution 1 Drop(s) Both EYES four times a day  enoxaparin Injectable 40 milliGRAM(s) SubCutaneous every 24 hours    MEDICATIONS  (PRN):      VITALS & EXAMINATION:  Vital Signs Last 24 Hrs  T(C): 36.6 (15 Aug 2022 05:10), Max: 37.1 (14 Aug 2022 08:53)  T(F): 97.8 (15 Aug 2022 05:10), Max: 98.7 (14 Aug 2022 08:53)  HR: 69 (15 Aug 2022 05:10) (61 - 78)  BP: 152/84 (15 Aug 2022 05:10) (109/64 - 152/84)  BP(mean): --  RR: 18 (15 Aug 2022 05:10) (18 - 18)  SpO2: 99% (15 Aug 2022 05:10) (96% - 100%)    Parameters below as of 15 Aug 2022 05:10  Patient On (Oxygen Delivery Method): room air          LABS:  CBC                       12.3   4.63  )-----------( 240      ( 15 Aug 2022 06:42 )             40.0     Chem 08-15    140  |  103  |  16  ----------------------------<  109<H>  4.0   |  24  |  0.78    Ca    9.8      15 Aug 2022 06:40  Phos  3.5     08-15  Mg     1.9     08-15    TPro  7.0  /  Alb  4.3  /  TBili  0.2  /  DBili  x   /  AST  30  /  ALT  31  /  AlkPhos  120  08-14    LFTs LIVER FUNCTIONS - ( 14 Aug 2022 14:58 )  Alb: 4.3 g/dL / Pro: 7.0 g/dL / ALK PHOS: 120 U/L / ALT: 31 U/L / AST: 30 U/L / GGT: x           Coagulopathy PT/INR - ( 13 Aug 2022 21:58 )   PT: 11.8 sec;   INR: 1.02 ratio         PTT - ( 13 Aug 2022 21:58 )  PTT:30.2 sec  Lipid Panel 08-14 Chol 225<H> LDL -- HDL 47<L> Trig 181<H>  A1c   Cardiac enzymes     U/A Urinalysis Basic - ( 14 Aug 2022 14:05 )    Color: Colorless / Appearance: Clear / S.012 / pH: x  Gluc: x / Ketone: Negative  / Bili: Negative / Urobili: Negative   Blood: x / Protein: Negative / Nitrite: Negative   Leuk Esterase: Negative / RBC: x / WBC x   Sq Epi: x / Non Sq Epi: x / Bacteria: x        STUDIES & IMAGING:    Studies (EKG, EEG, EMG, etc):       Radiology (XR, CT, MR, U/S, TTE/JJ):     SUBJECTIVE/INTERVAL HISTORY:  Patient seen and examined at bedside with Neurology attending and team. Yesterday reported to have expressed frustration with wait time for MRI, otherwise no acute events. This AM patient reports intermittent aching/pressure-like right sided head pain and periorbital pain OD, worse with horizontal eye movements and head turns.   Of note, she reports that she initially had eye pain in her left eye, which resolved and was followed by pain in her right eye that persisted. She reports feeling as though her right eyelid was drooping, which is part of what initially prompted her presentation to the ED. She does not report blurry vision or diplopia. No jaw pain, scalp tenderness or shoulder weakness.     ROS: As above, otherwise negative.     FHx: Non-contributory        MEDICATIONS:  MEDICATIONS  (STANDING):  artificial tears (preservative free) Ophthalmic Solution 1 Drop(s) Both EYES four times a day  enoxaparin Injectable 40 milliGRAM(s) SubCutaneous every 24 hours    MEDICATIONS  (PRN):      VITALS & EXAMINATION:  Vital Signs Last 24 Hrs  T(C): 36.6 (15 Aug 2022 05:10), Max: 37.1 (14 Aug 2022 08:53)  T(F): 97.8 (15 Aug 2022 05:10), Max: 98.7 (14 Aug 2022 08:53)  HR: 69 (15 Aug 2022 05:10) (61 - 78)  BP: 152/84 (15 Aug 2022 05:10) (109/64 - 152/84)  RR: 18 (15 Aug 2022 05:10) (18 - 18)  SpO2: 99% (15 Aug 2022 05:10) (96% - 100%)    Parameters below as of 15 Aug 2022 05:10  Patient On (Oxygen Delivery Method): room air    General:  Constitutional: appears stated age, NAD.  Respiratory: Breathing comfortably on room air.    Neurological (>12):  MS: Eyes open, alert, oriented to person, place, situation, time. Follows all commands.    Speech/Language: Clear, fluent with good repetition. Naming intact.    CNs: PERRL. No APD. CVF intact. No facial asymmetry b/l, full eye closure strength b/l. Hearing grossly normal to conversation. Symmetric palate elevation in midline, no uvula deviation. Gag reflex deferred. Head turning & shoulder shrug intact b/l. Tongue midline, normal movements.    Motor: Muscle bulk and tone are normal. There is no pronator drift. All extremities antigravity.    Sensation: Intact to LT b/l throughout.     Coordination: No dysmetria to FTN/HTS.    Gait: No postural instability. Normal stance.      LABS:  CBC                       12.3   4.63  )-----------( 240      ( 15 Aug 2022 06:42 )             40.0     Chem 08-15    140  |  103  |  16  ----------------------------<  109<H>  4.0   |  24  |  0.78    Ca    9.8      15 Aug 2022 06:40  Phos  3.5     08-15  Mg     1.9     08-15    TPro  7.0  /  Alb  4.3  /  TBili  0.2  /  DBili  x   /  AST  30  /  ALT  31  /  AlkPhos  120  -    LFTs LIVER FUNCTIONS - ( 14 Aug 2022 14:58 )  Alb: 4.3 g/dL / Pro: 7.0 g/dL / ALK PHOS: 120 U/L / ALT: 31 U/L / AST: 30 U/L / GGT: x           Coagulopathy PT/INR - ( 13 Aug 2022 21:58 )   PT: 11.8 sec;   INR: 1.02 ratio         PTT - ( 13 Aug 2022 21:58 )  PTT:30.2 sec  Lipid Panel  Chol 225<H> LDL -- HDL 47<L> Trig 181<H>  A1c   Cardiac enzymes     U/A Urinalysis Basic - ( 14 Aug 2022 14:05 )    Color: Colorless / Appearance: Clear / S.012 / pH: x  Gluc: x / Ketone: Negative  / Bili: Negative / Urobili: Negative   Blood: x / Protein: Negative / Nitrite: Negative   Leuk Esterase: Negative / RBC: x / WBC x   Sq Epi: x / Non Sq Epi: x / Bacteria: x        STUDIES & IMAGING:    MR Brain & Orbits w/wo IV Cont (22 @ 22:46)     MRI BRAIN:  No hydrocephalus, mass effect, acute intracranial hemorrhage, vasogenic edema, or acute territorial infarct.  No abnormal parenchymal or leptomeningeal enhancement.    MRI ORBITS:  Abnormal signal and enhancement within the intraorbital right optic nerve, consistent with the presence of optic neuritis. Differential diagnosis includes demyelinating disease, infection such as Lyme disease, as well as inflammatory and autoimmune disorders.

## 2022-08-15 NOTE — DISCHARGE NOTE PROVIDER - NSDCMRMEDTOKEN_GEN_ALL_CORE_FT
ocular lubricant ophthalmic solution: 1 drop(s) to each affected eye 4 times a day as needed.  pantoprazole 40 mg oral delayed release tablet: 1 tab(s) orally once a day with steroids.  MDD:40mg  predniSONE 20 mg oral tablet: 3 tab(s) orally once a day x 4 days  2 tab(s) orally once a day x 4 days  1 tab(s) orally once a day x 4 days

## 2022-08-15 NOTE — DISCHARGE NOTE PROVIDER - PROVIDER TOKENS
PROVIDER:[TOKEN:[[id number]],FOLLOWUP:[2 weeks]],PROVIDER:[TOKEN:[[id number]],FOLLOWUP:[2 weeks]]

## 2022-08-15 NOTE — DISCHARGE NOTE PROVIDER - NSDCCPCAREPLAN_GEN_ALL_CORE_FT
PRINCIPAL DISCHARGE DIAGNOSIS  Diagnosis: Optic neuritis, right  Assessment and Plan of Treatment: You were treated for optic neuritis of the right eye with a 3 day course of IV steroids. Please continue treatment with the oral steroid taper prescribed. You will take 60mg (3 tabs) daily for 4 days, followed by 40mg (2 tabs) daily for 4 days, and finally 20mg (1 tab) daily for 4 days. You should follow up with Neuroimmunology, Dr. Garrison, in 2 weeks time. You will receive a phone call to schedule this appointment. You should also follow up with Neuro-Ophthalmology, Dr. Nino, and should to schedule an appointment at the number provided below. Please call your Neurologist, PCP, [phone] or return to the ER if you experience new or worsening symptoms, including new severe headache, vision loss, or weakness of the extremities.

## 2022-08-15 NOTE — PROGRESS NOTE ADULT - SUBJECTIVE AND OBJECTIVE BOX
[facility] DEPARTMENT OF OPHTHALMOLOGY  ------------------------------------------------------------------------------    Interval History: Following pain in R side of head with eye movements. No acute events overnight. Today patient states that pain with EOM is not in R eye, but rather along R side of head. Denying blurred vision. Denies temporal headache, jaw claudication, scalp tenderness, muscle aches, fevers, chills, weight loss.     MEDICATIONS  (STANDING):  artificial tears (preservative free) Ophthalmic Solution 1 Drop(s) Both EYES four times a day  dextrose 5%. 1000 milliLiter(s) (50 mL/Hr) IV Continuous <Continuous>  dextrose 5%. 1000 milliLiter(s) (100 mL/Hr) IV Continuous <Continuous>  dextrose 50% Injectable 25 Gram(s) IV Push once  dextrose 50% Injectable 12.5 Gram(s) IV Push once  dextrose 50% Injectable 25 Gram(s) IV Push once  enoxaparin Injectable 40 milliGRAM(s) SubCutaneous every 24 hours  glucagon  Injectable 1 milliGRAM(s) IntraMuscular once  insulin lispro (ADMELOG) corrective regimen sliding scale   SubCutaneous three times a day before meals  insulin lispro (ADMELOG) corrective regimen sliding scale   SubCutaneous at bedtime  methylPREDNISolone sodium succinate IVPB 1000 milliGRAM(s) IV Intermittent daily  pantoprazole    Tablet 40 milliGRAM(s) Oral daily    MEDICATIONS  (PRN):  dextrose Oral Gel 15 Gram(s) Oral once PRN Blood Glucose LESS THAN 70 milliGRAM(s)/deciliter      VITALS: T(C): 36.9 (08-15-22 @ 17:03)  T(F): 98.4 (08-15-22 @ 17:03), Max: 98.9 (08-15-22 @ 14:26)  HR: 85 (08-15-22 @ 17:03) (63 - 85)  BP: 115/54 (08-15-22 @ 17:03) (115/54 - 152/84)  RR:  (18 - 18)  SpO2:  (96% - 100%)  Wt(kg): --  General: AAO x 3, appropriate mood and affect    Ophthalmology Exam:  Visual acuity (sc): 20/20 OU  Pupils: PERRL OU, no APD  Ttono: 18 OD, 17 OS  Extraocular movements (EOMs): Full OU, no pain, no diplopia  Confrontational Visual Field (CVF): Full OU  Color Plates: 12/12 OU. No red desaturation.     Pen Light Exam (PLE)  External: Flat OU, temporal pulses strong b/l  Lids/Lashes/Lacrimal Ducts: Flat OU    Sclera/Conjunctiva: W+Q OU  Cornea: Cl OU  Anterior Chamber: D+F OU    Iris: Flat OU  Lens: Cl OU    ACC: [id number] EXAM:  MR ORBITS ONLY [facility]                        ACC: [id number] EXAM:  MR BRAIN [facility]                          PROCEDURE DATE:  08/14/2022      IMPRESSION:    MRI BRAIN:  No hydrocephalus, mass effect, acute intracranial hemorrhage, vasogenic   edema, or acute territorial infarct.    No abnormal parenchymal or leptomeningeal enhancement.    MRI ORBITS:  Abnormal signal and enhancement within the intraorbital right optic   nerve, consistent with the presence of optic neuritis. Differential   diagnosis includes demyelinating disease, infection such as Lyme disease,   as well as inflammatory and autoimmune disorders.     [facility] DEPARTMENT OF OPHTHALMOLOGY  ------------------------------------------------------------------------------    Interval History: Following pain in R side of head with eye movements. No acute events overnight. Today patient states that pain with EOM is not in R eye, but rather along R side of head. Denying blurred vision. Denies temporal headache, jaw claudication, scalp tenderness, muscle aches, fevers, chills, weight loss.     MEDICATIONS  (STANDING):  artificial tears (preservative free) Ophthalmic Solution 1 Drop(s) Both EYES four times a day  dextrose 5%. 1000 milliLiter(s) (50 mL/Hr) IV Continuous <Continuous>  dextrose 5%. 1000 milliLiter(s) (100 mL/Hr) IV Continuous <Continuous>  dextrose 50% Injectable 25 Gram(s) IV Push once  dextrose 50% Injectable 12.5 Gram(s) IV Push once  dextrose 50% Injectable 25 Gram(s) IV Push once  enoxaparin Injectable 40 milliGRAM(s) SubCutaneous every 24 hours  glucagon  Injectable 1 milliGRAM(s) IntraMuscular once  insulin lispro (ADMELOG) corrective regimen sliding scale   SubCutaneous three times a day before meals  insulin lispro (ADMELOG) corrective regimen sliding scale   SubCutaneous at bedtime  methylPREDNISolone sodium succinate IVPB 1000 milliGRAM(s) IV Intermittent daily  pantoprazole    Tablet 40 milliGRAM(s) Oral daily    MEDICATIONS  (PRN):  dextrose Oral Gel 15 Gram(s) Oral once PRN Blood Glucose LESS THAN 70 milliGRAM(s)/deciliter      VITALS: T(C): 36.9 (08-15-22 @ 17:03)  T(F): 98.4 (08-15-22 @ 17:03), Max: 98.9 (08-15-22 @ 14:26)  HR: 85 (08-15-22 @ 17:03) (63 - 85)  BP: 115/54 (08-15-22 @ 17:03) (115/54 - 152/84)  RR:  (18 - 18)  SpO2:  (96% - 100%)  Wt(kg): --  General: AAO x 3, appropriate mood and affect    Ophthalmology Exam:  Visual acuity (sc): 20/20 OU  Pupils: PERRL OU, no APD  Ttono: 18 OD, 17 OS  Extraocular movements (EOMs): Full OU, no pain, no diplopia  Confrontational Visual Field (CVF): Full OU  Color Plates: 12/12 OU. No red desaturation.     Pen Light Exam (PLE)  External: Flat OU, temporal pulses strong b/l, no tenderness OU  Lids/Lashes/Lacrimal Ducts: Flat OU    Sclera/Conjunctiva: W+Q OU  Cornea: Cl OU  Anterior Chamber: D+F OU    Iris: Flat OU  Lens: Cl OU    ACC: [id number] EXAM:  MR ORBITS ONLY [facility]                        ACC: [id number] EXAM:  MR BRAIN [facility]                          PROCEDURE DATE:  08/14/2022      IMPRESSION:    MRI BRAIN:  No hydrocephalus, mass effect, acute intracranial hemorrhage, vasogenic   edema, or acute territorial infarct.    No abnormal parenchymal or leptomeningeal enhancement.    MRI ORBITS:  Abnormal signal and enhancement within the intraorbital right optic   nerve, consistent with the presence of optic neuritis. Differential   diagnosis includes demyelinating disease, infection such as Lyme disease,   as well as inflammatory and autoimmune disorders.

## 2022-08-15 NOTE — DISCHARGE NOTE PROVIDER - CARE PROVIDER_API CALL
Yamilet Garrison)  Neurology  [address] [place] [address]  Phone: [phone]  Fax: [phone]  Follow Up Time: 2 weeks    Kulwant Nino)  Ophthalmology  [address] [facility], [address]  [place] [address]  Phone: [phone]  Fax: [phone]  Follow Up Time: 2 weeks

## 2022-08-16 LAB
B BURGDOR C6 AB SER-ACNC: NEGATIVE — SIGNIFICANT CHANGE UP
B BURGDOR IGG+IGM SER-ACNC: 0.22 INDEX — SIGNIFICANT CHANGE UP (ref 0.01–0.89)
GLUCOSE BLDC GLUCOMTR-MCNC: 141 MG/DL — HIGH (ref 70–99)
GLUCOSE BLDC GLUCOMTR-MCNC: 167 MG/DL — HIGH (ref 70–99)
GLUCOSE BLDC GLUCOMTR-MCNC: 181 MG/DL — HIGH (ref 70–99)
GLUCOSE BLDC GLUCOMTR-MCNC: 211 MG/DL — HIGH (ref 70–99)
GLUCOSE BLDC GLUCOMTR-MCNC: 242 MG/DL — HIGH (ref 70–99)
RHEUMATOID FACT SERPL-ACNC: <10 IU/ML — SIGNIFICANT CHANGE UP (ref 0–13)
T PALLIDUM AB TITR SER: NEGATIVE — SIGNIFICANT CHANGE UP

## 2022-08-16 PROCEDURE — 99232 SBSQ HOSP IP/OBS MODERATE 35: CPT

## 2022-08-16 RX ORDER — ENOXAPARIN SODIUM 100 MG/ML
40 INJECTION SUBCUTANEOUS EVERY 24 HOURS
Refills: 0 | Status: DISCONTINUED | OUTPATIENT
Start: 2022-08-16 | End: 2022-08-17

## 2022-08-16 RX ADMIN — Medication 1: at 08:03

## 2022-08-16 RX ADMIN — ENOXAPARIN SODIUM 40 MILLIGRAM(S): 100 INJECTION SUBCUTANEOUS at 17:38

## 2022-08-16 RX ADMIN — PANTOPRAZOLE SODIUM 40 MILLIGRAM(S): 20 TABLET, DELAYED RELEASE ORAL at 12:26

## 2022-08-16 RX ADMIN — Medication 1 DROP(S): at 00:16

## 2022-08-16 RX ADMIN — Medication 58 MILLIGRAM(S): at 17:38

## 2022-08-16 RX ADMIN — Medication 1 DROP(S): at 12:26

## 2022-08-16 RX ADMIN — Medication 1 DROP(S): at 17:47

## 2022-08-16 RX ADMIN — Medication 1 DROP(S): at 06:12

## 2022-08-16 NOTE — PROGRESS NOTE ADULT - ASSESSMENT
Assessment and Recommendations:  66y female w/ no pmhx/ochx consulted for pain along R side of head with EOM, found to have R optic neuritis on MRI imaging.     1. R head/orbital pain   - Pt denies ocular pain with eye movements, rather pain is along R side of head  - denies temporal headache, jaw claudication, scalp tenderness, muscle aches, fevers, chills, weight loss. Temporal pulses strong b/l.  Low suspicion for GCA as ROS is negative.  - MRI orbits showing optic neuritis however based on symptoms, eye exam findings, and imaging, diagnosis more suggestive of an optic perineuritis/IOIS.  Pt also has elevated ESR and CRP that suggest an inflammatory process.  - Appears to be somewhat of an atypical case. Pt has no visual acuity loss, no APD, no color plate deficit, no red desaturation, no CVF loss.   - Solumedrol and GI prophylaxis per neurology  - RPR, FTA, ACE, lysosyme, ANCA, IgG4, DEVIKA pending    Pt seen and discussed with Dr. Leary, neuro ophthalmology attending.     Outpatient follow-up: Patient should follow-up with his/her ophthalmologist or with [facility] Department of Neuro-Ophthalmology at the address below within 1 week of discharge, sooner if symptoms worsen or change    [address] [facility] [facility]  [place] [address]  [phone]

## 2022-08-16 NOTE — PROGRESS NOTE ADULT - ASSESSMENT
66 year-old woman with no reported PMH who presented on 8/13 with c/o right sided temporal and posterior neck headache described as a pressure like sensation, as well as right eye pain with eye movements and possible drooping of the right eyelid. VS on presentation notable for /90, otherwise wnl. Initial labs significant for ESR 67, CRP 20. CTH w/o, CTA H/N unrevealing. MRI brain w/wo negative for acute pathology or abnormal enhancement. MRI orbits w/wo reported to demonstrate abnormal signal and enhancement w/in the intraorbital right optic nerve.     Impression: Right sided headache and ocular pain OD with eye movements with findings on MRI consistent with active optic neuritis.     Plan:   [x] MRI head w/wo 8/14  [x] MRI orbits w/wo 8/14  [/] Start IV Solumedrol 1000mg for 3-5 days (8/15-) pending symptom improvement  [/] Serum studies: ESR (67), CRP (20)  --> Pending: DEVIKA, SSA/SSB, RF, DsDNA, ANCA, Lyme, Syphilis, Ig4, NMO, MOG, TB, ACE  [] Will discuss utility of LP with attending in AM  [x] Appreciate Ophthalmology evaluation  [x] Pain management, can consider IV tylenol, IV magnesium  [x] PT/OT 8/14: no skilled needs  ---  - DVT ppx: lovenox  - Diet: regular  - ISS & POCT TIDAC & HS while on IV steroids  - GI ppx while on IV steroids  - Outpatient follow-up with Neurology, Dr. Garrison, upon discharge  - Outpatient follow-up with Neuro-Ophthalmology, Dr. Nino, upon discharge     66 year-old woman with no reported PMH who presented on 8/13 with c/o right sided temporal and posterior neck headache described as a pressure like sensation, as well as right eye pain with eye movements and possible drooping of the right eyelid. VS on presentation notable for /90, otherwise wnl. Initial labs significant for ESR 67, CRP 20. CTH w/o, CTA H/N unrevealing. MRI brain w/wo negative for acute pathology or abnormal enhancement. MRI orbits w/wo reported to demonstrate abnormal signal and enhancement w/in the intraorbital right optic nerve.     Impression: Right sided headache and ocular pain OD with eye movements with findings on MRI consistent with active optic neuritis.     Plan:   [x] MRI head w/wo 8/14  [x] MRI orbits w/wo 8/14  [/] Start IV Solumedrol 1000mg for 3-5 days (8/15-) pending symptom improvement  [/] Serum studies: ESR (67), CRP (20)  --> Pending: DEVIKA, SSA/SSB, RF, dsDNA, ANCA, Lyme, Syphilis, Ig4, NMO, MOG, TB, ACE  [] Family to decide on LP today   [x] Appreciate Ophthalmology evaluation  [x] Pain management, can consider IV tylenol, IV magnesium  [x] PT/OT 8/14: no skilled needs  ---  - DVT ppx: lovenox  - Diet: regular  - ISS & POCT TIDAC & HS while on IV steroids  - GI ppx while on IV steroids  - Outpatient follow-up with Neurology, Dr. Garrison, upon discharge  - Outpatient follow-up with Neuro-Ophthalmology, Dr. Nino, upon discharge    Seen/discussed with Neurology attending, Dr. Garrison. 66 year-old woman with no reported PMH who presented on 8/13 with c/o right sided temporal and posterior neck headache described as a pressure like sensation, as well as right eye pain with eye movements and possible drooping of the right eyelid. VS on presentation notable for /90, otherwise wnl. Initial labs significant for ESR 67, CRP 20. CTH w/o, CTA H/N unrevealing. MRI brain w/wo negative for acute pathology or abnormal enhancement. MRI orbits w/wo reported to demonstrate abnormal signal and enhancement w/in the intraorbital right optic nerve.     Impression: Right sided headache and ocular pain OD with eye movements with findings on MRI consistent with active optic neuritis.     Plan:   [x] MRI head w/wo 8/14  [x] MRI orbits w/wo 8/14  [/] Start IV Solumedrol 1000mg for 3-5 days (8/15-) pending symptom improvement  [/] Serum studies: ESR (67), CRP (20)  --> Pending: DEVIKA, SSA/SSB, RF, dsDNA, ANCA, Lyme, Syphilis, Ig4, NMO, MOG, TB, ACE  [] Family to decide on LP today   [x] Appreciate Ophthalmology evaluation  [x] Pain management, can consider IV tylenol, IV magnesium  [x] PT/OT 8/14: no skilled needs  ---  - DVT ppx: holding today for possible LP  - Diet: regular  - ISS & POCT TIDAC & HS while on IV steroids  - GI ppx while on IV steroids  - Outpatient follow-up with Neurology, Dr. Garrison, upon discharge  - Outpatient follow-up with Neuro-Ophthalmology, Dr. Nino, upon discharge    Seen/discussed with Neurology attending, Dr. Garrison.

## 2022-08-16 NOTE — PROGRESS NOTE ADULT - SUBJECTIVE AND OBJECTIVE BOX
SUBJECTIVE/INTERVAL HISTORY:  ***      ROS: As above, otherwise negative.     FHx: Non-contributory        MEDICATIONS:  MEDICATIONS  (STANDING):  artificial tears (preservative free) Ophthalmic Solution 1 Drop(s) Both EYES four times a day  enoxaparin Injectable 40 milliGRAM(s) SubCutaneous every 24 hours    MEDICATIONS  (PRN):      VITALS & EXAMINATION:  Vital Signs Last 24 Hrs  T(C): 36.6 (15 Aug 2022 05:10), Max: 37.1 (14 Aug 2022 08:53)  T(F): 97.8 (15 Aug 2022 05:10), Max: 98.7 (14 Aug 2022 08:53)  HR: 69 (15 Aug 2022 05:10) (61 - 78)  BP: 152/84 (15 Aug 2022 05:10) (109/64 - 152/84)  RR: 18 (15 Aug 2022 05:10) (18 - 18)  SpO2: 99% (15 Aug 2022 05:10) (96% - 100%)    Parameters below as of 15 Aug 2022 05:10  Patient On (Oxygen Delivery Method): room air    General:  Constitutional: appears stated age, NAD.  Respiratory: Breathing comfortably on room air.    Neurological (>12):  MS: Eyes open, alert, oriented to person, place, situation, time. Follows all commands.    Speech/Language: Clear, fluent with good repetition. Naming intact.    CNs: PERRL. No APD. CVF intact. No facial asymmetry b/l, full eye closure strength b/l. Hearing grossly normal to conversation. Symmetric palate elevation in midline, no uvula deviation. Gag reflex deferred. Head turning & shoulder shrug intact b/l. Tongue midline, normal movements.    Motor: Muscle bulk and tone are normal. There is no pronator drift. All extremities antigravity.    Sensation: Intact to LT b/l throughout.     Coordination: No dysmetria to FTN/HTS.    Gait: No postural instability. Normal stance.      LABS:  CBC                       12.3   4.63  )-----------( 240      ( 15 Aug 2022 06:42 )             40.0     Chem 08-15    140  |  103  |  16  ----------------------------<  109<H>  4.0   |  24  |  0.78    Ca    9.8      15 Aug 2022 06:40  Phos  3.5     08-15  Mg     1.9     08-15    TPro  7.0  /  Alb  4.3  /  TBili  0.2  /  DBili  x   /  AST  30  /  ALT  31  /  AlkPhos  120  -    LFTs LIVER FUNCTIONS - ( 14 Aug 2022 14:58 )  Alb: 4.3 g/dL / Pro: 7.0 g/dL / ALK PHOS: 120 U/L / ALT: 31 U/L / AST: 30 U/L / GGT: x           Coagulopathy PT/INR - ( 13 Aug 2022 21:58 )   PT: 11.8 sec;   INR: 1.02 ratio         PTT - ( 13 Aug 2022 21:58 )  PTT:30.2 sec  Lipid Panel  Chol 225<H> LDL -- HDL 47<L> Trig 181<H>  A1c   Cardiac enzymes     U/A Urinalysis Basic - ( 14 Aug 2022 14:05 )    Color: Colorless / Appearance: Clear / S.012 / pH: x  Gluc: x / Ketone: Negative  / Bili: Negative / Urobili: Negative   Blood: x / Protein: Negative / Nitrite: Negative   Leuk Esterase: Negative / RBC: x / WBC x   Sq Epi: x / Non Sq Epi: x / Bacteria: x        STUDIES & IMAGING:    MR Brain & Orbits w/wo IV Cont (22 @ 22:46)     MRI BRAIN:  No hydrocephalus, mass effect, acute intracranial hemorrhage, vasogenic edema, or acute territorial infarct.  No abnormal parenchymal or leptomeningeal enhancement.    MRI ORBITS:  Abnormal signal and enhancement within the intraorbital right optic nerve, consistent with the presence of optic neuritis. Differential diagnosis includes demyelinating disease, infection such as Lyme disease, as well as inflammatory and autoimmune disorders.   SUBJECTIVE/INTERVAL HISTORY: Patient seen and examined at bedside this AM. No acute events overnight. She received her first dose of IV steroids yesterday. This AM reports improvement in eye pain from 8-9/10 yesterday to 5/10 today.      ROS: As above, otherwise negative.     FHx: Non-contributory        MEDICATIONS:  MEDICATIONS  (STANDING):  artificial tears (preservative free) Ophthalmic Solution 1 Drop(s) Both EYES four times a day  enoxaparin Injectable 40 milliGRAM(s) SubCutaneous every 24 hours    MEDICATIONS  (PRN):      VITALS & EXAMINATION:  Vital Signs Last 24 Hrs  T(C): 36.6 (15 Aug 2022 05:10), Max: 37.1 (14 Aug 2022 08:53)  T(F): 97.8 (15 Aug 2022 05:10), Max: 98.7 (14 Aug 2022 08:53)  HR: 69 (15 Aug 2022 05:10) (61 - 78)  BP: 152/84 (15 Aug 2022 05:10) (109/64 - 152/84)  RR: 18 (15 Aug 2022 05:10) (18 - 18)  SpO2: 99% (15 Aug 2022 05:10) (96% - 100%)    Parameters below as of 15 Aug 2022 05:10  Patient On (Oxygen Delivery Method): room air    General:  Constitutional: appears stated age, NAD.  Respiratory: Breathing comfortably on room air.    Neurological (>12):  MS: Eyes open, alert, oriented to person, place, situation, time. Follows all commands.    Speech/Language: Clear, fluent with good repetition. Naming intact.    CNs: PERRL. No APD. CVF intact. No facial asymmetry b/l, full eye closure strength b/l. Hearing grossly normal to conversation. Symmetric palate elevation in midline, no uvula deviation. Gag reflex deferred. Head turning & shoulder shrug intact b/l. Tongue midline, normal movements.    Motor: Muscle bulk and tone are normal. There is no pronator drift. All extremities antigravity.    Sensation: Intact to LT b/l throughout.     Coordination: No dysmetria to FTN/HTS.    Gait: No postural instability. Normal stance.      LABS:  CBC                       12.3   4.63  )-----------( 240      ( 15 Aug 2022 06:42 )             40.0     Chem 08-15    140  |  103  |  16  ----------------------------<  109<H>  4.0   |  24  |  0.78    Ca    9.8      15 Aug 2022 06:40  Phos  3.5     08-15  Mg     1.9     -15    TPro  7.0  /  Alb  4.3  /  TBili  0.2  /  DBili  x   /  AST  30  /  ALT  31  /  AlkPhos  120  -    LFTs LIVER FUNCTIONS - ( 14 Aug 2022 14:58 )  Alb: 4.3 g/dL / Pro: 7.0 g/dL / ALK PHOS: 120 U/L / ALT: 31 U/L / AST: 30 U/L / GGT: x           Coagulopathy PT/INR - ( 13 Aug 2022 21:58 )   PT: 11.8 sec;   INR: 1.02 ratio         PTT - ( 13 Aug 2022 21:58 )  PTT:30.2 sec  Lipid Panel  Chol 225<H> LDL -- HDL 47<L> Trig 181<H>  A1c   Cardiac enzymes     U/A Urinalysis Basic - ( 14 Aug 2022 14:05 )    Color: Colorless / Appearance: Clear / S.012 / pH: x  Gluc: x / Ketone: Negative  / Bili: Negative / Urobili: Negative   Blood: x / Protein: Negative / Nitrite: Negative   Leuk Esterase: Negative / RBC: x / WBC x   Sq Epi: x / Non Sq Epi: x / Bacteria: x        STUDIES & IMAGING:    MR Brain & Orbits w/wo IV Cont (22 @ 22:46)     MRI BRAIN:  No hydrocephalus, mass effect, acute intracranial hemorrhage, vasogenic edema, or acute territorial infarct.  No abnormal parenchymal or leptomeningeal enhancement.    MRI ORBITS:  Abnormal signal and enhancement within the intraorbital right optic nerve, consistent with the presence of optic neuritis. Differential diagnosis includes demyelinating disease, infection such as Lyme disease, as well as inflammatory and autoimmune disorders.

## 2022-08-16 NOTE — PROGRESS NOTE ADULT - SUBJECTIVE AND OBJECTIVE BOX
[facility] DEPARTMENT OF OPHTHALMOLOGY  ------------------------------------------------------------------------------  Krystle Mendez MD PGY-3  ------------------------------------------------------------------------------    Interval History: No acute events overnight. Reports improved pain with eye movements today after starting steroids.    MEDICATIONS  (STANDING):  artificial tears (preservative free) Ophthalmic Solution 1 Drop(s) Both EYES four times a day  dextrose 5%. 1000 milliLiter(s) (50 mL/Hr) IV Continuous <Continuous>  dextrose 5%. 1000 milliLiter(s) (100 mL/Hr) IV Continuous <Continuous>  dextrose 50% Injectable 25 Gram(s) IV Push once  dextrose 50% Injectable 12.5 Gram(s) IV Push once  dextrose 50% Injectable 25 Gram(s) IV Push once  glucagon  Injectable 1 milliGRAM(s) IntraMuscular once  insulin lispro (ADMELOG) corrective regimen sliding scale   SubCutaneous three times a day before meals  insulin lispro (ADMELOG) corrective regimen sliding scale   SubCutaneous at bedtime  methylPREDNISolone sodium succinate IVPB 1000 milliGRAM(s) IV Intermittent daily  pantoprazole    Tablet 40 milliGRAM(s) Oral daily    MEDICATIONS  (PRN):  dextrose Oral Gel 15 Gram(s) Oral once PRN Blood Glucose LESS THAN 70 milliGRAM(s)/deciliter      VITALS: T(C): 36.7 (08-16-22 @ 08:53)  T(F): 98.1 (08-16-22 @ 08:53), Max: 99.6 (08-15-22 @ 22:16)  HR: 83 (08-16-22 @ 08:53) (72 - 85)  BP: 145/78 (08-16-22 @ 08:53) (115/54 - 147/83)  RR:  (16 - 18)  SpO2:  (95% - 98%)  Wt(kg): --  General: AAO x 3, appropriate mood and affect    Ophthalmology Exam:  Visual acuity (sc): 20/20 OU  Pupils: PERRL OU, no APD  Extraocular movements (EOMs): Full OU prior  Confrontational Visual Field (CVF): Full OU prior  Color Plates: 12/12 OU prior    Pen Light Exam (PLE)  External: Flat OU  Lids/Lashes/Lacrimal Ducts: Flat OU    Sclera/Conjunctiva: W+Q OU  Cornea: Cl OU  Anterior Chamber: D+F OU    Iris: Flat OU  Lens: Cl OU

## 2022-08-17 ENCOUNTER — TRANSCRIPTION ENCOUNTER (OUTPATIENT)
Age: 67
End: 2022-08-17

## 2022-08-17 VITALS
DIASTOLIC BLOOD PRESSURE: 65 MMHG | HEART RATE: 82 BPM | RESPIRATION RATE: 18 BRPM | TEMPERATURE: 98 F | OXYGEN SATURATION: 97 % | SYSTOLIC BLOOD PRESSURE: 130 MMHG

## 2022-08-17 LAB
ACE SERPL-CCNC: 108 U/L — HIGH (ref 14–82)
ALBUMIN SERPL ELPH-MCNC: 4.2 G/DL — SIGNIFICANT CHANGE UP (ref 3.3–5)
ALP SERPL-CCNC: 107 U/L — SIGNIFICANT CHANGE UP (ref 40–120)
ALT FLD-CCNC: 34 U/L — SIGNIFICANT CHANGE UP (ref 10–45)
ANION GAP SERPL CALC-SCNC: 12 MMOL/L — SIGNIFICANT CHANGE UP (ref 5–17)
AST SERPL-CCNC: 28 U/L — SIGNIFICANT CHANGE UP (ref 10–40)
AUTO DIFF PNL BLD: ABNORMAL
BILIRUB SERPL-MCNC: 0.1 MG/DL — LOW (ref 0.2–1.2)
BUN SERPL-MCNC: 18 MG/DL — SIGNIFICANT CHANGE UP (ref 7–23)
C-ANCA SER-ACNC: NEGATIVE — SIGNIFICANT CHANGE UP
CALCIUM SERPL-MCNC: 9.5 MG/DL — SIGNIFICANT CHANGE UP (ref 8.4–10.5)
CHLORIDE SERPL-SCNC: 104 MMOL/L — SIGNIFICANT CHANGE UP (ref 96–108)
CO2 SERPL-SCNC: 23 MMOL/L — SIGNIFICANT CHANGE UP (ref 22–31)
CREAT SERPL-MCNC: 0.75 MG/DL — SIGNIFICANT CHANGE UP (ref 0.5–1.3)
DSDNA AB SER-ACNC: <12 IU/ML — SIGNIFICANT CHANGE UP
EGFR: 89 ML/MIN/1.73M2 — SIGNIFICANT CHANGE UP
GLUCOSE BLDC GLUCOMTR-MCNC: 178 MG/DL — HIGH (ref 70–99)
GLUCOSE BLDC GLUCOMTR-MCNC: 183 MG/DL — HIGH (ref 70–99)
GLUCOSE SERPL-MCNC: 193 MG/DL — HIGH (ref 70–99)
HCT VFR BLD CALC: 34.5 % — SIGNIFICANT CHANGE UP (ref 34.5–45)
HGB BLD-MCNC: 11 G/DL — LOW (ref 11.5–15.5)
MAGNESIUM SERPL-MCNC: 2.1 MG/DL — SIGNIFICANT CHANGE UP (ref 1.6–2.6)
MCHC RBC-ENTMCNC: 27.3 PG — SIGNIFICANT CHANGE UP (ref 27–34)
MCHC RBC-ENTMCNC: 31.9 GM/DL — LOW (ref 32–36)
MCV RBC AUTO: 85.6 FL — SIGNIFICANT CHANGE UP (ref 80–100)
NRBC # BLD: 0 /100 WBCS — SIGNIFICANT CHANGE UP (ref 0–0)
P-ANCA SER-ACNC: NEGATIVE — SIGNIFICANT CHANGE UP
PHOSPHATE SERPL-MCNC: 3.6 MG/DL — SIGNIFICANT CHANGE UP (ref 2.5–4.5)
PLATELET # BLD AUTO: 253 K/UL — SIGNIFICANT CHANGE UP (ref 150–400)
POTASSIUM SERPL-MCNC: 4.1 MMOL/L — SIGNIFICANT CHANGE UP (ref 3.5–5.3)
POTASSIUM SERPL-SCNC: 4.1 MMOL/L — SIGNIFICANT CHANGE UP (ref 3.5–5.3)
PROT SERPL-MCNC: 6.9 G/DL — SIGNIFICANT CHANGE UP (ref 6–8.3)
RBC # BLD: 4.03 M/UL — SIGNIFICANT CHANGE UP (ref 3.8–5.2)
RBC # FLD: 12.5 % — SIGNIFICANT CHANGE UP (ref 10.3–14.5)
SODIUM SERPL-SCNC: 139 MMOL/L — SIGNIFICANT CHANGE UP (ref 135–145)
WBC # BLD: 9.04 K/UL — SIGNIFICANT CHANGE UP (ref 3.8–10.5)
WBC # FLD AUTO: 9.04 K/UL — SIGNIFICANT CHANGE UP (ref 3.8–10.5)

## 2022-08-17 PROCEDURE — 99233 SBSQ HOSP IP/OBS HIGH 50: CPT

## 2022-08-17 PROCEDURE — 99232 SBSQ HOSP IP/OBS MODERATE 35: CPT

## 2022-08-17 RX ORDER — PANTOPRAZOLE SODIUM 20 MG/1
1 TABLET, DELAYED RELEASE ORAL
Qty: 12 | Refills: 0
Start: 2022-08-17 | End: 2022-08-28

## 2022-08-17 RX ADMIN — Medication 1 DROP(S): at 13:51

## 2022-08-17 RX ADMIN — Medication 58 MILLIGRAM(S): at 13:45

## 2022-08-17 RX ADMIN — Medication 1 DROP(S): at 06:45

## 2022-08-17 RX ADMIN — PANTOPRAZOLE SODIUM 40 MILLIGRAM(S): 20 TABLET, DELAYED RELEASE ORAL at 13:52

## 2022-08-17 RX ADMIN — Medication 1 DROP(S): at 00:32

## 2022-08-17 NOTE — DISCHARGE NOTE NURSING/CASE MANAGEMENT/SOCIAL WORK - NSDCPEFALRISK_GEN_ALL_CORE
For information on Fall & Injury Prevention, visit: [email or web address] OR  [email or web address] OR  https://www.cdc.gov/steadi/patient.html

## 2022-08-17 NOTE — PROGRESS NOTE ADULT - ATTENDING COMMENTS
Pt reports symptoms overall stable and have slightly improved. Experiencing right eye pain with looking around. She denies any blurring of vision. She continues to get right sided head pain- pressure pain. Denies any scalp tenderness. No jaw claudication. NO hx of headaches.   Daughter at bedside. Pt reports a symptoms initially started with eye pain in left eye, she reports every time she would press on eyelid or move eyes it would cause pain- this resolved and now involving right eye in the last few days.     No facial asymmetry  PERRL, no definite APD appreciated  VF intact  VA 20/40 OU  Facial sensations intact to LT    Labs significant for elevated ESR (67) and CRP (20)  CTA H/N with patent vasculature.   MRI brain w/ minimal chronic microvascular ischemic disease  MRI orbits with right intraorbital optic nerve signal hyperintensity and enhancement consistent with optic neuritis    Imp:- Right ocular pain w/o vision loss + right temporal headaches -   Although presentation slightly atypical for optic neuritis, given the presence of elevated inflammatory markers and MRI orbits consistent with findings of active optic neuritis, will proceed with treating with steroids at this time. Lower suspicion for temporal arteritis.     Right optic neuritis   Plan:-   [] Recommend treating with 3-5 days of IV solumedrol 1000 mg QD- depending on symptom improvement.   [] Will also order labs for autoimmune/inflammatory/Infectious diseases- DEVIKA, SSA/SSB, RF, DsDNA, ANCA, Lyme, Syphilis, Ig4, NMO, MOG, TB, ACE.
I have interviewed and examined the patient and reviewed the residents note including the history, exam, assessment, and plan.  I agree with the residents assessment and plan.    66y female w/ no pmhx/ochx consulted for pain along R side of head with EOM, found to have R optic neuritis on MRI imaging.     1. R head/orbital pain   - Pt denies ocular pain with eye movements, rather pain is along R side of head  - GCA ROS negative: denies temporal headache, jaw claudication, scalp tenderness, muscle aches, fevers, chills, weight loss. Temporal pulses strong b/l.  Low suspicion for GCA as ROS is negative.  - MRI orbits showing optic neuritis as above - further review with neuro-ophtho Dr. Nino, based on symptoms, eye exam findings, and imaging, diagnosis more suggestive of an optic perineuritis/IOIS.  Pt also has elevated ESR and CRP that suggest an inflammatory process.  - Appears to be somewhat of an atypical case. Pt has no visual acuity loss, no APD, no color plate deficit, no red desaturation, no CVF loss.   - Solumedrol and GI prophylaxis per neurology  - would recommend check RPR, FTA, ACE, lysosyme, ANCA, IgG4, DEVIKA  - findings and plan discussed with patient, primary team, and neuro-ophtho, plan per neuro/primary team/neuro ophtho  - will follow    Cecy Willett MD
Right optic neuritis-  significantly better. Eye pain is now down to 1/10. Last day of solumedrol  3/3 today (will dose in the afternoon). Stable for dc home later today. Will dc with oral steroid taper- 60 gm x 4 days, 40 mg x 4 days and 20 mg x 4 days.   Pt refused LP- will re address in clinic.   Will follow up with labs.   follow up with me in 2 weeks from discharge.
Right optic neuritis.    Received 1st dose of IV solumedrol 1g yesterday. This AM reports pain with eye movements is down from a 8/10 to a 5/10. She denies any other new symptoms. Continues to deny vision loss.   No APD, VF intact, EOMI.    Serum blood work up send out. Discussed obtaining a spinal tap for a comprehensive work up- pt wishes to think about this and will let us know.     Will plan for at least 3 days of IV steroids. Patient would like to go home- if symptoms significantly better tomorrow, can be dc'd with outpatient follow up +/- oral steroid taper.

## 2022-08-17 NOTE — DISCHARGE NOTE NURSING/CASE MANAGEMENT/SOCIAL WORK - PATIENT PORTAL LINK FT
You can access the FollowMyHealth Patient Portal offered by [facility] by registering at the following website: [email or web address]. By joining [employer]’s FollowMyHealth portal, you will also be able to view your health information using other applications (apps) compatible with our system.

## 2022-08-17 NOTE — PROGRESS NOTE ADULT - REASON FOR ADMISSION
R eye pain and headache

## 2022-08-17 NOTE — PROGRESS NOTE ADULT - SUBJECTIVE AND OBJECTIVE BOX
SUBJECTIVE/INTERVAL HISTORY: Patient seen and examined at bedside this AM with Neurology attending and team. No acute events overnight. She received her second dose of IV steroids yesterday and will complete her treatment today. This AM reports continued improvement in eye pain from 5/10 yesterday to 1/10 today, most notably with horizontal eye movements. She has been OOB walking the halls with PT. She does not report additional complaints at this time.      ROS: As above, otherwise negative.     FHx: Non-contributory        MEDICATIONS:  MEDICATIONS  (STANDING):  artificial tears (preservative free) Ophthalmic Solution 1 Drop(s) Both EYES four times a day  enoxaparin Injectable 40 milliGRAM(s) SubCutaneous every 24 hours    MEDICATIONS  (PRN):      VITALS & EXAMINATION:  Vital Signs Last 24 Hrs  T(C): 36.6 (15 Aug 2022 05:10), Max: 37.1 (14 Aug 2022 08:53)  T(F): 97.8 (15 Aug 2022 05:10), Max: 98.7 (14 Aug 2022 08:53)  HR: 69 (15 Aug 2022 05:10) (61 - 78)  BP: 152/84 (15 Aug 2022 05:10) (109/64 - 152/84)  RR: 18 (15 Aug 2022 05:10) (18 - 18)  SpO2: 99% (15 Aug 2022 05:10) (96% - 100%)    Parameters below as of 15 Aug 2022 05:10  Patient On (Oxygen Delivery Method): room air    General:  Constitutional: appears stated age, NAD.  Respiratory: Breathing comfortably on room air.    Neurological (>12):  MS: Eyes open, alert, oriented to person, place, situation, time. Follows all commands.    Speech/Language: Clear, fluent with good repetition. Naming intact.    CNs: PERRL. No APD. CVF intact. No facial asymmetry b/l, full eye closure strength b/l. Hearing grossly normal to conversation. Symmetric palate elevation in midline, no uvula deviation. Gag reflex deferred. Head turning & shoulder shrug intact b/l. Tongue midline, normal movements.    Motor: Muscle bulk and tone are normal. There is no pronator drift. All extremities antigravity.    Sensation: Intact to LT b/l throughout.     Coordination: No dysmetria to FTN/HTS.    Gait: No postural instability. Normal stance.      LABS:  CBC                       12.3   4.63  )-----------( 240      ( 15 Aug 2022 06:42 )             40.0     Chem 08-15    140  |  103  |  16  ----------------------------<  109<H>  4.0   |  24  |  0.78    Ca    9.8      15 Aug 2022 06:40  Phos  3.5     08-15  Mg     1.9     -15    TPro  7.0  /  Alb  4.3  /  TBili  0.2  /  DBili  x   /  AST  30  /  ALT  31  /  AlkPhos  120  -    LFTs LIVER FUNCTIONS - ( 14 Aug 2022 14:58 )  Alb: 4.3 g/dL / Pro: 7.0 g/dL / ALK PHOS: 120 U/L / ALT: 31 U/L / AST: 30 U/L / GGT: x           Coagulopathy PT/INR - ( 13 Aug 2022 21:58 )   PT: 11.8 sec;   INR: 1.02 ratio         PTT - ( 13 Aug 2022 21:58 )  PTT:30.2 sec  Lipid Panel  Chol 225<H> LDL -- HDL 47<L> Trig 181<H>  A1c   Cardiac enzymes     U/A Urinalysis Basic - ( 14 Aug 2022 14:05 )    Color: Colorless / Appearance: Clear / S.012 / pH: x  Gluc: x / Ketone: Negative  / Bili: Negative / Urobili: Negative   Blood: x / Protein: Negative / Nitrite: Negative   Leuk Esterase: Negative / RBC: x / WBC x   Sq Epi: x / Non Sq Epi: x / Bacteria: x        STUDIES & IMAGING:    MR Brain & Orbits w/wo IV Cont (22 @ 22:46)     MRI BRAIN:  No hydrocephalus, mass effect, acute intracranial hemorrhage, vasogenic edema, or acute territorial infarct.  No abnormal parenchymal or leptomeningeal enhancement.    MRI ORBITS:  Abnormal signal and enhancement within the intraorbital right optic nerve, consistent with the presence of optic neuritis. Differential diagnosis includes demyelinating disease, infection such as Lyme disease, as well as inflammatory and autoimmune disorders.   SUBJECTIVE/INTERVAL HISTORY: Patient seen and examined at bedside this AM with Neurology attending and team. No acute events overnight. She received her second dose of IV steroids yesterday and will complete her treatment today. This AM reports continued improvement in eye pain from 5/10 yesterday to 1/10 today, most notably with horizontal eye movements. She has been OOB walking the halls with PT. She does not report additional complaints at this time.      ROS: As above, otherwise negative.     FHx: Non-contributory        MEDICATIONS:  MEDICATIONS  (STANDING):  artificial tears (preservative free) Ophthalmic Solution 1 Drop(s) Both EYES four times a day  enoxaparin Injectable 40 milliGRAM(s) SubCutaneous every 24 hours    MEDICATIONS  (PRN):      VITALS & EXAMINATION:  Vital Signs Last 24 Hrs  T(C): 36.6 (15 Aug 2022 05:10), Max: 37.1 (14 Aug 2022 08:53)  T(F): 97.8 (15 Aug 2022 05:10), Max: 98.7 (14 Aug 2022 08:53)  HR: 69 (15 Aug 2022 05:10) (61 - 78)  BP: 152/84 (15 Aug 2022 05:10) (109/64 - 152/84)  RR: 18 (15 Aug 2022 05:10) (18 - 18)  SpO2: 99% (15 Aug 2022 05:10) (96% - 100%)    Parameters below as of 15 Aug 2022 05:10  Patient On (Oxygen Delivery Method): room air    General:  Constitutional: appears stated age, NAD.  Respiratory: Breathing comfortably on room air.    Neurological (>12):  MS: Eyes open, alert, oriented to person, place, situation, time. Follows all commands.    Speech/Language: Clear, fluent with good repetition. Naming intact.    CNs: PERRL. No APD. CVF intact. No facial asymmetry b/l, full eye closure strength b/l. Hearing grossly normal to conversation. Symmetric palate elevation in midline, no uvula deviation. Gag reflex deferred. Head turning & shoulder shrug intact b/l. Tongue midline, normal movements.    Motor: Muscle bulk and tone are normal. There is no pronator drift. All extremities antigravity.    Sensation: Intact to LT b/l throughout.     Coordination: No dysmetria to FTN/HTS.    Gait: No postural instability. Normal stance.      LABS:  CBC                          11.0   9.04  )-----------( 253      ( 17 Aug 2022 06:31 )             34.5             Chem     139  |  104  |  18  ----------------------------<  193<H>  4.1   |  23  |  0.75    Ca    9.5      17 Aug 2022 06:33  Phos  3.6       Mg     2.1         TPro  6.9  /  Alb  4.2  /  TBili  0.1<L>  /  DBili  x   /  AST  28  /  ALT  34  /  AlkPhos  107         PTT - ( 13 Aug 2022 21:58 )  PTT:30.2 sec  Lipid Panel  Chol 225<H> LDL -- HDL 47<L> Trig 181<H>  A1c   Cardiac enzymes     U/A Urinalysis Basic - ( 14 Aug 2022 14:05 )    Color: Colorless / Appearance: Clear / S.012 / pH: x  Gluc: x / Ketone: Negative  / Bili: Negative / Urobili: Negative   Blood: x / Protein: Negative / Nitrite: Negative   Leuk Esterase: Negative / RBC: x / WBC x   Sq Epi: x / Non Sq Epi: x / Bacteria: x        STUDIES & IMAGING:    MR Brain & Orbits w/wo IV Cont (22 @ 22:46)     MRI BRAIN:  No hydrocephalus, mass effect, acute intracranial hemorrhage, vasogenic edema, or acute territorial infarct.  No abnormal parenchymal or leptomeningeal enhancement.    MRI ORBITS:  Abnormal signal and enhancement within the intraorbital right optic nerve, consistent with the presence of optic neuritis. Differential diagnosis includes demyelinating disease, infection such as Lyme disease, as well as inflammatory and autoimmune disorders.

## 2022-08-17 NOTE — PROGRESS NOTE ADULT - ASSESSMENT
66 year-old woman with no reported PMH who presented on 8/13 with c/o right sided temporal and posterior neck headache described as a pressure like sensation, as well as right eye pain with eye movements and possible drooping of the right eyelid. VS on presentation notable for /90, otherwise wnl. Initial labs significant for ESR 67, CRP 20. CTH w/o, CTA H/N unrevealing. MRI brain w/wo negative for acute pathology or abnormal enhancement. MRI orbits w/wo reported to demonstrate abnormal signal and enhancement w/in the intraorbital right optic nerve.     Impression: Right sided headache and ocular pain OD with eye movements with findings on MRI consistent with active optic neuritis.     Plan:   [x] MRI head w/wo 8/14  [x] MRI orbits w/wo 8/14  [/] IV Solumedrol 1000mg for 3 days (8/15-8/17)   [/] Serum studies: ESR (67), CRP (20), lyme (-), syphilis (-)  --> Pending: DEVIKA, SSA/SSB, RF, dsDNA, ANCA, Ig4, NMO, MOG, TB, ACE  [x] No LP per patient and family  [x] Appreciate Ophthalmology evaluation  [x] Pain management, can consider IV tylenol, IV magnesium  [x] PT/OT 8/14: no skilled needs  ---  - DVT ppx: lovenox   - Diet: regular  - ISS & POCT TIDAC & HS while on IV steroids  - GI ppx while on IV steroids  - Outpatient follow-up with Neurology, Dr. Garrison, upon discharge  - Outpatient follow-up with Neuro-Ophthalmology, Dr. Nino, upon discharge    Seen/discussed with Neurology attending, Dr. Garrison. 66 year-old woman with no reported PMH who presented on 8/13 with c/o right sided temporal and posterior neck headache described as a pressure like sensation, as well as right eye pain with eye movements and possible drooping of the right eyelid. VS on presentation notable for /90, otherwise wnl. Initial labs significant for ESR 67, CRP 20. CTH w/o, CTA H/N unrevealing. MRI brain w/wo negative for acute pathology or abnormal enhancement. MRI orbits w/wo reported to demonstrate abnormal signal and enhancement w/in the intraorbital right optic nerve.     Impression: Right sided headache and ocular pain OD with eye movements with findings on MRI consistent with active optic neuritis.     Plan:   [x] MRI head w/wo 8/14  [x] MRI orbits w/wo 8/14  [/] IV Solumedrol 1000mg for 3 days (8/15-8/17), day 3/3  -> Will discharge on oral steroid taper  [/] Serum studies: ESR (67), CRP (20), lyme (-), syphilis (-)  --> Pending: DEVIKA, SSA/SSB, RF, dsDNA, ANCA, Ig4, NMO, MOG, TB, ACE  [x] No LP per patient and family  [x] Appreciate Ophthalmology evaluation  [x] Pain management, can consider IV tylenol, IV magnesium  [x] PT/OT 8/14: no skilled needs  ---  - DVT ppx: lovenox   - Diet: regular  - ISS & POCT TIDAC & HS while on IV steroids  - GI ppx while on IV steroids  - Outpatient follow-up with Neurology, Dr. Garrison, upon discharge  - Outpatient follow-up with Neuro-Ophthalmology, Dr. Nino, upon discharge    Seen/discussed with Neurology attending, Dr. Garrison.

## 2022-08-18 LAB
ANA PAT FLD IF-IMP: ABNORMAL
ANA TITR SER: ABNORMAL
IGG4 SER-MCNC: 30 MG/DL — SIGNIFICANT CHANGE UP (ref 2–96)

## 2022-08-19 LAB
DSDNA AB FLD-ACNC: <0.2 AI — SIGNIFICANT CHANGE UP
ENA SS-A AB FLD IA-ACNC: <0.2 AI — SIGNIFICANT CHANGE UP
GAMMA INTERFERON BACKGROUND BLD IA-ACNC: 0.02 IU/ML — SIGNIFICANT CHANGE UP
M TB IFN-G BLD-IMP: ABNORMAL
M TB IFN-G CD4+ BCKGRND COR BLD-ACNC: 0 IU/ML — SIGNIFICANT CHANGE UP
M TB IFN-G CD4+CD8+ BCKGRND COR BLD-ACNC: 0 IU/ML — SIGNIFICANT CHANGE UP
QUANT TB PLUS MITOGEN MINUS NIL: 0.22 IU/ML — SIGNIFICANT CHANGE UP

## 2022-08-22 LAB
AQP4 H2O CHANNEL AB SERPL IA-ACNC: NEGATIVE — SIGNIFICANT CHANGE UP
MOG AB SER QL CBA IFA: NEGATIVE — SIGNIFICANT CHANGE UP

## 2022-08-26 ENCOUNTER — NON-APPOINTMENT (OUTPATIENT)
Age: 67
End: 2022-08-26

## 2022-08-30 ENCOUNTER — APPOINTMENT (OUTPATIENT)
Dept: NEUROLOGY | Facility: CLINIC | Age: 67
End: 2022-08-30

## 2022-08-30 VITALS
WEIGHT: 136 LBS | SYSTOLIC BLOOD PRESSURE: 124 MMHG | DIASTOLIC BLOOD PRESSURE: 69 MMHG | HEIGHT: 62 IN | HEART RATE: 80 BPM | BODY MASS INDEX: 25.03 KG/M2

## 2022-08-30 DIAGNOSIS — Z82.61 FAMILY HISTORY OF ARTHRITIS: ICD-10-CM

## 2022-08-30 DIAGNOSIS — Z82.49 FAMILY HISTORY OF ISCHEMIC HEART DISEASE AND OTHER DISEASES OF THE CIRCULATORY SYSTEM: ICD-10-CM

## 2022-08-30 DIAGNOSIS — H46.9 UNSPECIFIED OPTIC NEURITIS: ICD-10-CM

## 2022-08-30 PROCEDURE — 99215 OFFICE O/P EST HI 40 MIN: CPT

## 2022-08-30 RX ORDER — CELECOXIB 200 MG/1
200 CAPSULE ORAL DAILY
Qty: 15 | Refills: 0 | Status: DISCONTINUED | COMMUNITY
Start: 2020-01-09 | End: 2022-08-30

## 2022-08-30 RX ORDER — MULTIVITAMIN/IRON/FOLIC ACID 18MG-0.4MG
600-400 TABLET ORAL DAILY
Qty: 60 | Refills: 1 | Status: DISCONTINUED | COMMUNITY
Start: 2020-01-09 | End: 2022-08-30

## 2022-08-31 NOTE — HISTORY OF PRESENT ILLNESS
[FreeTextEntry1] : HPI (initial visit Aug 30, 2022)- Rosanne is a 65 yo woman admitted to [facility] 8.14.2022-8.17.2022 for new onset ocular pain x 2 weeks. She reports waking up one morning with pain in her eyes, especially when pressing her eyes. The pain on the left resolved, however the right eye pain progressed and started to experience pain with eye movements and a right sided headed. She denied any blurry vision/scotomas. She denies any hx of headaches. She has a hx of episodic brief vertigo ("once in a blue moon")- likely BPPV and had COVID-19 infection x 2 (Autumn 2021 and June 2022). She denied any scalp tenderness or jaw claudication. \par \par Opthalmology was consulted and no ocular findings were found. MRI brain showed minimal chronic microvascular ischemic changes and MRI orbits showed right intraorbital optic nerve signal hyperintensity and\par enhancement consistent with optic neuritis. CTA H/N with patent vasculature.\par \par She was treated with 3 days of IV solumedrol 1g with significant improvement in symptoms discharged on a 12 day course of prednisone taper- completed. She returns for follow up to review labs results. She has overall been doing well. Symptoms have pretty much resolved. She denies any SOB/chest pain/cough. She has an appt with neuro ophtho, Dr. Kulwant Nino, on 9/9. She is leaving for the [facility] this week x 5 days. No family hx of autoimmune disease.\par Of note, pts ESR/CRP have been elevated dating back to ~ 2013. She denies any hx of focal weakness/numbness of extremities. Chart reviewed- had been followed by Dr. Douglas Cardenas in the past for arthralgias and notes mentioned numbness in fingers and left foot?- will review this with patient at next visit. \par \par Labs significant for elevated ESR (67) and CRP (20). Elevated  [H]. DEVIKA 1:320 (homogenous). Indeterminate Quant TB\par Negative (normal) - RF, DsDNA, CCP, IgG4, TSH, HbA1c, B12, Lyme, Syphilis, SSA/SSB, cANCA/pANCA, COVID, NMO and MOG. \par Vitamin D 33.8\par \par

## 2022-08-31 NOTE — DATA REVIEWED
[de-identified] : Imaging:- 8/2022-\par CTA H/N with patent vasculature.\par MRI brain w/ minimal chronic microvascular ischemic disease\par MRI orbits with right intraorbital optic nerve signal hyperintensity and\par enhancement consistent with optic neuritis [de-identified] : Labs 8/2022 significant for elevated ESR (67) and CRP (20). Elevated  [H]. DEVIKA 1:320 (homogenous). Indeterminate Quant TB\par Negative (normal) - RF, DsDNA, CCP, IgG4, TSH, HbA1c, B12, Lyme, Syphilis, SSA/SSB, cANCA/pANCA, COVID, NMO and MOG. \par Vitamin D 33.8

## 2022-08-31 NOTE — ASSESSMENT
[FreeTextEntry1] : Assessment/Plan:\par  66 year old female w/ an atypical presentation of right optic neuritis (pain with eye movements w/o visual deficits,  RAPD or color vision deficits), however with orbit MRI with clear signal abnormality and enhancement in right intraorbital optic nerve segment suggestive of right optic neuritis s/p  3 days of IV solumedrol and 12 days of prednisone taper with resolution of ocular pain. Brain MRI without any signs of inflammation/demyelination. \par \par Given elevated ESR and CRP with elevated ACE and hx of arthralgias, and given the clinical presentation seemed more consistent with optic perineuritis rather than optic neuritis, the possibility of sarcoidosis should be raised. Will proceed with further work to help clarify diagnosis. \par \par Plan:-\par [] Will repeat the following labs:- ESR, CRP, ACE, Quant TB\par [] Will order Vitamin D 1,25\par [] Will order CT chest w/w/o to rule out any findings concerning for sarcoid\par [] Will order spinal tap. Will check for following labs:-\par 1. CSF cell count\par 2. CSF glucose\par 3. CSF protein\par 4. CSF culture/grain stain\par 5. Oligoclonal bands and IgG index\par 6. CSF Lyme\par 7. CSF ACE\par 8. CSF Cytology\par 9. CSF PCR\par Discussed the procedure and side effects with patients; including infection, bleeding, nerve injury and post spinal headache.\par \par \par Return to clinic 1 month\par \par Available imaging studies and/or blood work up were reviewed. A detailed chart review was completed prior to visit.\par \par The above plan was discussed with LUISITO ASHLEY in great detail.  LUISITO ASHLEY verbalized understanding and agrees with plan as detailed above. Patient was provided education and counselling on current diagnosis/symptoms and diagnostic work up. She was advised to call our clinic at [phone] for any new or worsening symptoms, or with any questions or concerns. In case of acute onset of neurological symptoms or worsening presentation, patient was advised to present to nearest emergency room for further evaluation. LUISITO ASHLEY expressed understanding and all her questions/concerns were addressed.\par \par Yamilet Garrison M.D\par

## 2022-08-31 NOTE — PHYSICAL EXAM
[FreeTextEntry1] : PHYSICAL EXAM\par Constitutional: Alert, no acute distress \par Psychiatric: appropriate affect and mood\par Pulmonary: No respiratory distress, stable on room air\par \par NEUROLOGICAL EXAM\par Mental status: The patient is alert, attentive, and conversational memory intact\par Speech/language: Clear and fluent with intact comprehension\par Cranial nerves:\par CN II: Visual fields are full to confrontation. Pupil size equal and briskly reactive to light. No red color desaturation. VA 20/30 OU. \par CN III, IV, VI: EOMI, no nystagmus, no ptosis\par CN V: Facial sensation is intact to pinprick in all 3 divisions bilaterally.\par CN VII: Face is symmetric with normal eye closure and smile.\par CN VII: Hearing is normal to rubbing fingers\par CN IX, X: Palate elevates symmetrically. Phonation is normal.\par CN XI: Head turning and shoulder shrug are intact\par CN XII: Tongue is midline with normal movements and no atrophy.\par Motor: Strength is full bilaterally. 5/5 muscle power in bilateral UE and LE.\par Reflexes: Reflexes are 2+ and symmetric at the biceps, knees, and ankles. Plantar responses are flexor.\par Sensory: Intact sensations to light touch in upper and lower extremities. \par Coordination: Rapid alternating movements and fine finger movements are intact. There is no dysmetria on finger-to-nose. There are no abnormal or extraneous movements. \par Gait/Stance: Posture is normal. Gait is steady with normal steps, base, arm swing, and turning.\par \par \par \par \par

## 2022-09-01 PROBLEM — Z00.00 ENCOUNTER FOR PREVENTIVE HEALTH EXAMINATION: Noted: 2022-09-01

## 2022-09-07 ENCOUNTER — APPOINTMENT (OUTPATIENT)
Dept: OPHTHALMOLOGY | Facility: CLINIC | Age: 67
End: 2022-09-07

## 2022-09-07 ENCOUNTER — NON-APPOINTMENT (OUTPATIENT)
Age: 67
End: 2022-09-07

## 2022-09-07 PROCEDURE — 99214 OFFICE O/P EST MOD 30 MIN: CPT

## 2022-09-07 PROCEDURE — 92083 EXTENDED VISUAL FIELD XM: CPT

## 2022-10-11 ENCOUNTER — APPOINTMENT (OUTPATIENT)
Dept: OPHTHALMOLOGY | Facility: CLINIC | Age: 67
End: 2022-10-11

## 2022-10-11 ENCOUNTER — NON-APPOINTMENT (OUTPATIENT)
Age: 67
End: 2022-10-11

## 2022-10-11 PROCEDURE — 99214 OFFICE O/P EST MOD 30 MIN: CPT

## 2022-10-11 PROCEDURE — 92083 EXTENDED VISUAL FIELD XM: CPT

## 2022-10-26 ENCOUNTER — APPOINTMENT (OUTPATIENT)
Dept: NEUROLOGY | Facility: CLINIC | Age: 67
End: 2022-10-26

## 2022-11-25 NOTE — ED CDU PROVIDER NOTE - DATE/TIME 2
Valve Clinic TAVR - 11/25/22  (See consult note from Dr. Ordoñez)    Referring provider: Dr. Hardy     Labs completed at visit today: Yes; pending at time of visit     Labs reviewed prior to clinic: Yes- 9/19/22 labs: Albumin 3.5, K 3.8, Na 140, Crt 0.95, GFR 79, WBC 6.5, Hgb 14.2, hematocrit 43.9 and plt 203    Preliminary STS Score: 3%      KCCQ12 (date completed 11/25/22), scanned into chart      PMH: CAD, CAG (30 yrs ago- LIMA to LAD, vein graft to OM which is occluded), severe low flow low gradient aortic stenosis, HFpEF- NYHA class II, ischemic cardiomyopathy, paroxysmal afib (on Eliquis), HDL, tricuspid and mitral regurgitation, PVD, AAA without rupture (endographic repair in 2020).     Pt last seen in clinic with Dr. Hardy 9/1/22- had a left/right heart cath 9/19/2022 with Dr. Ordoñez showing severe CAD; L LAD patent, native OM & D branch possible PCI targets. Pt sent for a PET scan 9/23/22 for viability assessment.     Patient recently seen in [facility] 10/10/22 for increasing shortness of breath and fatigue. No diuretics added at that time. Recs to pursue TAVR.     Symptoms: dyspnea on exertion, shortness of breath and fatigue. Has become progressively worse.     Social: Lucero garcia spouse presented to clinic today with patient.       TTE date 9/19/22  EF 30-35 %  AV mean gradient: 15.9 mmHg  AV Peak Pablo: 255 m/sec  AV area: 0.87 cm2  AV DIM IND VTI: 0.25  LV SVi: 30.2 ml/m2    Comments on valves: Mitral valve has mild regurgitation and thickened leaflets, no stenosis. Tricuspid valve has no stenosis or regurgitation. Aortic valve has mild calcification and regurgitation. Patient has severe low flow low gradient aortic stenosis.       Plan: Pt would like to move forward with workup for TAVR. Pt will be scheduled for a scheduled PCI to the diagonal, CTA TAVR and CV surgery consult. Orders placed and msg sent to .     Pt will contact dentist for dental clearance. Pt given  clearance form and instructed to have dentist fill out and fax back to clinic.     Reviewed pre-procedure work up and procedural related education information with patient and spouse Lucero. All questions answered, no further questions at this time. Patient has my direct contact information as well as the valve clinic line and was encouraged to call with questions or concerns.       Thien Samson RN BSN- Valve Clinic Coordinator   [facility]  [facility]  Phone: [phone]  Fax: [phone]         06-Jan-2017 05:26

## 2022-12-12 PROBLEM — Z78.9 OTHER SPECIFIED HEALTH STATUS: Chronic | Status: ACTIVE | Noted: 2022-08-14

## 2022-12-14 NOTE — OCCUPATIONAL THERAPY INITIAL EVALUATION ADULT - NSOTDMEREC_GEN_A_CORE
no needs Calcipotriene Pregnancy And Lactation Text: This medication has not been proven safe during pregnancy. It is unknown if this medication is excreted in breast milk.

## 2022-12-16 ENCOUNTER — APPOINTMENT (OUTPATIENT)
Dept: ORTHOPEDIC SURGERY | Facility: CLINIC | Age: 67
End: 2022-12-16

## 2022-12-16 DIAGNOSIS — M54.16 RADICULOPATHY, LUMBAR REGION: ICD-10-CM

## 2022-12-16 DIAGNOSIS — M25.50 PAIN IN UNSPECIFIED JOINT: ICD-10-CM

## 2022-12-16 DIAGNOSIS — M17.0 BILATERAL PRIMARY OSTEOARTHRITIS OF KNEE: ICD-10-CM

## 2022-12-16 DIAGNOSIS — Z78.9 OTHER SPECIFIED HEALTH STATUS: ICD-10-CM

## 2022-12-16 DIAGNOSIS — M51.36 OTHER INTERVERTEBRAL DISC DEGENERATION, LUMBAR REGION: ICD-10-CM

## 2022-12-16 PROCEDURE — 72100 X-RAY EXAM L-S SPINE 2/3 VWS: CPT

## 2022-12-16 PROCEDURE — 72170 X-RAY EXAM OF PELVIS: CPT

## 2022-12-16 PROCEDURE — 99204 OFFICE O/P NEW MOD 45 MIN: CPT

## 2022-12-16 PROCEDURE — 73564 X-RAY EXAM KNEE 4 OR MORE: CPT | Mod: 50

## 2022-12-16 RX ORDER — MELOXICAM 15 MG/1
15 TABLET ORAL
Qty: 30 | Refills: 0 | Status: ACTIVE | COMMUNITY
Start: 2022-12-16 | End: 1900-01-01

## 2022-12-16 RX ORDER — METHYLPREDNISOLONE 4 MG/1
4 TABLET ORAL
Qty: 1 | Refills: 0 | Status: ACTIVE | COMMUNITY
Start: 2022-12-16 | End: 1900-01-01

## 2022-12-20 PROBLEM — Z78.9 NO PERTINENT PAST MEDICAL HISTORY: Status: RESOLVED | Noted: 2022-12-20 | Resolved: 2022-12-20

## 2022-12-20 NOTE — DISCUSSION/SUMMARY
[de-identified] : Instructions: Dx / Natural History\par The patient was advised of the diagnosis.  The natural history of the pathology was explained in full to the patient in layman's terms.  Several different treatment options were discussed and explained in full to the patient including the risks and benefits of both surgical and non-surgical treatments.  All questions and concerns were answered. \par \par RICE\par I explained to the patient that rest, ice, compression, and elevation would benefit them.  They may return to activity after follow-up or when they no longer have any pain.\par \par NSAIDs - OTC\par Patient is to begin over the counter oral anti-inflammatory medications on an as needed basis, as long as there are no medical contraindications.  Patient is counseled on possible GI and blood pressure side effects.\par \par Pain Guide Activities\par The patient was advised to let pain guide the gradual advancement of activities.\par \par Icing\par The patient was advised to apply ice (wrapped in a towel or protective covering) to the area daily (20 minutes at a time, 2-4X/day).\par \par All of the patient's questions were answered to Her satisfaction. Diagnoses and potential treatments were reviewed. She agreed with the plan and would like to move forward with it.

## 2022-12-20 NOTE — PHYSICAL EXAM
[Flexion] : flexion [Extension] : extension [Bending to left] : bending to left [Bending to right] : bending to right [Bilateral] : knee bilaterally [5___] : hamstring 5[unfilled]/5 [Equivocal] : equivocal Coni [] : negative Lachmann [[id number]] : flexion 115 degrees [de-identified] : extension 0 degrees

## 2022-12-20 NOTE — HISTORY OF PRESENT ILLNESS
[de-identified] : 12-16-22- She states several months of worsening pain throughout multiple joints. Her worse symptoms are throughout her lower extremities. states pain from hips through knees and into ankles worse with ambulation and start up. she has tried otc nsaids. also states pain in the shoulders but not as bad.\par works a desk type position [FreeTextEntry1] : bilateral knee and right hip

## 2022-12-20 NOTE — ASSESSMENT
[FreeTextEntry1] : she has nonspecific pain throughout her body. will start on therapy start medrol pack followed by mobic and advise rheum consult

## 2022-12-20 NOTE — IMAGING
[Bilateral] : knee bilaterally [FreeTextEntry1] : mild degenerative changes [de-identified] : normal [FreeTextEntry9] : mild degenerative changes

## 2022-12-22 ENCOUNTER — APPOINTMENT (OUTPATIENT)
Dept: NEUROLOGY | Facility: CLINIC | Age: 67
End: 2022-12-22

## 2023-04-25 ENCOUNTER — APPOINTMENT (OUTPATIENT)
Dept: NEUROLOGY | Facility: CLINIC | Age: 68
End: 2023-04-25

## 2023-06-13 ENCOUNTER — APPOINTMENT (OUTPATIENT)
Dept: NEUROLOGY | Facility: CLINIC | Age: 68
End: 2023-06-13

## 2023-06-13 VITALS
BODY MASS INDEX: 25.95 KG/M2 | DIASTOLIC BLOOD PRESSURE: 73 MMHG | SYSTOLIC BLOOD PRESSURE: 147 MMHG | HEIGHT: 62 IN | WEIGHT: 141 LBS | HEART RATE: 59 BPM

## 2024-11-18 NOTE — ED ADULT NURSE NOTE - CAS TRG GEN SKIN CONDITION
Pt discharged to home. Pt was given follow up instructions . Pt verbalized understanding of all instructions for discharge and is ambulatory out of ED with steady gait. Aox4. Bus pass provided to patient to go to [facility]     Dry/Warm

## 2025-08-01 ENCOUNTER — NON-APPOINTMENT (OUTPATIENT)
Age: 70
End: 2025-08-01

## 2025-08-01 ENCOUNTER — APPOINTMENT (OUTPATIENT)
Dept: OPHTHALMOLOGY | Facility: CLINIC | Age: 70
End: 2025-08-01
Payer: MEDICARE

## 2025-08-01 PROCEDURE — 92002 INTRM OPH EXAM NEW PATIENT: CPT

## 2025-08-01 PROCEDURE — 92015 DETERMINE REFRACTIVE STATE: CPT
